# Patient Record
Sex: FEMALE | Race: WHITE | NOT HISPANIC OR LATINO | Employment: OTHER | URBAN - METROPOLITAN AREA
[De-identification: names, ages, dates, MRNs, and addresses within clinical notes are randomized per-mention and may not be internally consistent; named-entity substitution may affect disease eponyms.]

---

## 2017-01-20 ENCOUNTER — CONVERSION ENCOUNTER (OUTPATIENT)
Dept: GENERAL RADIOLOGY | Facility: HOSPITAL | Age: 57
End: 2017-01-20

## 2018-01-24 ENCOUNTER — CONVERSION ENCOUNTER (OUTPATIENT)
Dept: GENERAL RADIOLOGY | Facility: HOSPITAL | Age: 58
End: 2018-01-24

## 2019-02-09 ENCOUNTER — HOSPITAL ENCOUNTER (OUTPATIENT)
Dept: MAMMOGRAPHY | Facility: HOSPITAL | Age: 59
Discharge: HOME | End: 2019-02-09
Attending: OBSTETRICS & GYNECOLOGY

## 2020-03-02 ENCOUNTER — OFFICE VISIT CONVERTED (OUTPATIENT)
Dept: PODIATRY | Facility: CLINIC | Age: 60
End: 2020-03-02
Attending: PODIATRIST

## 2020-06-04 ENCOUNTER — HOSPITAL ENCOUNTER (OUTPATIENT)
Dept: GENERAL RADIOLOGY | Facility: HOSPITAL | Age: 60
Discharge: HOME | End: 2020-06-04
Attending: OBSTETRICS & GYNECOLOGY

## 2020-10-28 ENCOUNTER — HOSPITAL ENCOUNTER (OUTPATIENT)
Dept: LAB | Facility: HOSPITAL | Age: 60
Discharge: HOME | End: 2020-10-28
Attending: FAMILY MEDICINE

## 2020-10-28 LAB
ALBUMIN SERPL-MCNC: 4.3 G/DL (ref 3.5–5)
ALBUMIN/GLOB SERPL: 1.8 {RATIO} (ref 1.4–2.6)
ALP SERPL-CCNC: 91 U/L (ref 53–141)
ALT SERPL-CCNC: 22 U/L (ref 10–40)
ANION GAP SERPL CALC-SCNC: 14 MMOL/L (ref 8–19)
AST SERPL-CCNC: 24 U/L (ref 15–50)
BASOPHILS # BLD AUTO: 0.03 10*3/UL (ref 0–0.2)
BASOPHILS NFR BLD AUTO: 0.8 % (ref 0–3)
BILIRUB SERPL-MCNC: 0.42 MG/DL (ref 0.2–1.3)
BUN SERPL-MCNC: 21 MG/DL (ref 5–25)
BUN/CREAT SERPL: 38 {RATIO} (ref 6–20)
CALCIUM SERPL-MCNC: 9.7 MG/DL (ref 8.7–10.4)
CHLORIDE SERPL-SCNC: 106 MMOL/L (ref 99–111)
CHOLEST SERPL-MCNC: 181 MG/DL (ref 107–200)
CHOLEST/HDLC SERPL: 3.2 {RATIO} (ref 3–6)
CONV ABS IMM GRAN: 0.01 10*3/UL (ref 0–0.2)
CONV CO2: 25 MMOL/L (ref 22–32)
CONV IMMATURE GRAN: 0.3 % (ref 0–1.8)
CONV TOTAL PROTEIN: 6.7 G/DL (ref 6.3–8.2)
CREAT UR-MCNC: 0.55 MG/DL (ref 0.5–0.9)
DEPRECATED RDW RBC AUTO: 42.4 FL (ref 36.4–46.3)
EOSINOPHIL # BLD AUTO: 0.07 10*3/UL (ref 0–0.7)
EOSINOPHIL # BLD AUTO: 1.8 % (ref 0–7)
ERYTHROCYTE [DISTWIDTH] IN BLOOD BY AUTOMATED COUNT: 11.8 % (ref 11.7–14.4)
GFR SERPLBLD BASED ON 1.73 SQ M-ARVRAT: >60 ML/MIN/{1.73_M2}
GLOBULIN UR ELPH-MCNC: 2.4 G/DL (ref 2–3.5)
GLUCOSE SERPL-MCNC: 101 MG/DL (ref 65–99)
HCT VFR BLD AUTO: 44.4 % (ref 37–47)
HDLC SERPL-MCNC: 56 MG/DL (ref 40–60)
HGB BLD-MCNC: 15.5 G/DL (ref 12–16)
LDLC SERPL CALC-MCNC: 111 MG/DL (ref 70–100)
LYMPHOCYTES # BLD AUTO: 1.29 10*3/UL (ref 1–5)
LYMPHOCYTES NFR BLD AUTO: 33.7 % (ref 20–45)
MCH RBC QN AUTO: 34.1 PG (ref 27–31)
MCHC RBC AUTO-ENTMCNC: 34.9 G/DL (ref 33–37)
MCV RBC AUTO: 97.6 FL (ref 81–99)
MONOCYTES # BLD AUTO: 0.51 10*3/UL (ref 0.2–1.2)
MONOCYTES NFR BLD AUTO: 13.3 % (ref 3–10)
NEUTROPHILS # BLD AUTO: 1.92 10*3/UL (ref 2–8)
NEUTROPHILS NFR BLD AUTO: 50.1 % (ref 30–85)
NRBC CBCN: 0 % (ref 0–0.7)
OSMOLALITY SERPL CALC.SUM OF ELEC: 295 MOSM/KG (ref 273–304)
PLATELET # BLD AUTO: 160 10*3/UL (ref 130–400)
PMV BLD AUTO: 9.9 FL (ref 9.4–12.3)
POTASSIUM SERPL-SCNC: 4.2 MMOL/L (ref 3.5–5.3)
RBC # BLD AUTO: 4.55 10*6/UL (ref 4.2–5.4)
SODIUM SERPL-SCNC: 141 MMOL/L (ref 135–147)
T4 FREE SERPL-MCNC: 1.1 NG/DL (ref 0.9–1.8)
TRIGL SERPL-MCNC: 71 MG/DL (ref 40–150)
TSH SERPL-ACNC: 0.02 M[IU]/L (ref 0.27–4.2)
VLDLC SERPL-MCNC: 14 MG/DL (ref 5–37)
WBC # BLD AUTO: 3.83 10*3/UL (ref 4.8–10.8)

## 2021-01-12 ENCOUNTER — OFFICE VISIT CONVERTED (OUTPATIENT)
Dept: FAMILY MEDICINE CLINIC | Facility: CLINIC | Age: 61
End: 2021-01-12
Attending: STUDENT IN AN ORGANIZED HEALTH CARE EDUCATION/TRAINING PROGRAM

## 2021-03-22 ENCOUNTER — HOSPITAL ENCOUNTER (OUTPATIENT)
Dept: GENERAL RADIOLOGY | Facility: HOSPITAL | Age: 61
Discharge: HOME | End: 2021-03-22
Attending: STUDENT IN AN ORGANIZED HEALTH CARE EDUCATION/TRAINING PROGRAM

## 2021-04-19 ENCOUNTER — CONVERSION ENCOUNTER (OUTPATIENT)
Dept: SURGERY | Facility: CLINIC | Age: 61
End: 2021-04-19

## 2021-04-19 ENCOUNTER — OFFICE VISIT CONVERTED (OUTPATIENT)
Dept: UROLOGY | Facility: CLINIC | Age: 61
End: 2021-04-19
Attending: UROLOGY

## 2021-04-19 LAB
BILIRUB UR QL STRIP: NEGATIVE
COLOR UR: YELLOW
CONV CLARITY OF URINE: CLEAR
CONV PROTEIN IN URINE BY AUTOMATED TEST STRIP: NEGATIVE
CONV UROBILINOGEN IN URINE BY AUTOMATED TEST STRIP: NORMAL
GLUCOSE UR QL: NEGATIVE
HGB UR QL STRIP: NEGATIVE
KETONES UR QL STRIP: NEGATIVE
LEUKOCYTE ESTERASE UR QL STRIP: NEGATIVE
NITRITE UR QL STRIP: NEGATIVE
PH UR STRIP.AUTO: 6.5 [PH]
SP GR UR: 1.02

## 2021-05-10 NOTE — H&P
History and Physical      Patient Name: Damaris Andrews   Patient ID: [id number]   Sex: Female   YOB: 1960    Primary Care Provider: Raffy Roland MD   Referring Provider: Jovanny Anderson DPSANYA    Visit Date: 2021    Provider: Charlene Alexandre MD   Location: [facility]   Location Address: [address] [place] [address], [address] [place] [address]  [id number]   Location Phone: [phone]          Chief Complaint     New pt here today to [facility] care.       History Of Present Illness  Damaris Andrews is a 60 year old /White female who presents for evaluation and treatment of:      60 years old female with past medical history of breast cancer/in remission/s/p chemo, hypertension, hypothyroidism and tachycardia comes to the clinic today to establish care and follow-up on chronic conditions.    Patient is following up with OB/GYN.    Patient's blood work 3 months ago reviewed; significant for low TSH but normal T4; patient is taking [place] Thyroid.    Patient is taking lisinopril for high blood pressure; controlled.    Patient denies any chest pain or shortness of breath with activities.           Past Medical History  Disease Name Date Onset Notes   Arthritis --  --    Athletes foot --  --    Breast cancer --  --    Ingrown toenail --  --    Plantar wart --  --          Past Surgical History  Procedure Name Date Notes    --  --    Mastectomy, Right --  --    Tubal ligation --  --    Tummy tuck --  --          Medication List  Name Date Started Instructions   [place] Thyroid oral  take 1 by oral route daily   estradiol 10 mcg vaginal tablet  insert 1 tablet (10 mcg) by vaginal route every other day.   lisinopril oral  --    naproxen 500 mg oral tablet  take 1 tablet (500 mg) by oral route 2 times per day with food   Premarin 0.625 mg oral tablet  take 1 tablet (0.625 mg) by oral route every other day.         Family Medical History  Disease Name Relative/Age Notes  "  Family history of stroke Father/   --    Family history of heart disease Father/   --          Social History  Finding Status Start/Stop Quantity Notes   Alcohol Current some day --/-- --  --    Tobacco --  --/-- --  --          Review of Systems  · Constitutional  o Denies  o : fatigue, fever  · Eyes  o Denies  o : discharge from eye, redness  · HENT  o Denies  o : headaches, congestion  · Cardiovascular  o Denies  o : chest pain, palpitations  · Respiratory  o Denies  o : shortness of breath, wheezing, cough  · Gastrointestinal  o Denies  o : vomiting, diarrhea, constipation  · Genitourinary  o Denies  o : dysuria, hematuria  · Integument  o Denies  o : rash, new skin lesions  · Neurologic  o Denies  o : altered mental status, seizures  · Musculoskeletal  o Denies  o : weakness, joint swelling  · Psychiatric  o Denies  o : anxiety, depression  · Heme-Lymph  o Denies  o : lymph node enlargement, tenderness      Vitals  Date Time BP Position Site L\R Cuff Size HR RR TEMP (F) WT  HT  BMI kg/m2 BSA m2 O2 Sat FR L/min FiO2 HC       01/12/2021 10:20 /97 Sitting    90 - R 16 98 149lbs 0oz 5'  1\" 28.15 1.71 97 %  21%          Physical Examination  · Constitutional  o Appearance  o : alert, in no acute distress, well developed, well-nourished  · Head and Face  o Head  o : normocephalic, atraumatic, non tender, no palpable masses or nodules.  o Face  o : no facial lesions  · Eyes  o Vision  o : Acuity: grossly normal at distance, Conjuntivae: Normal, Sclerae white, Pupils: PERRL, Cornea: Clear, no lesions bilateral  · Neck  o Inspection/Palpation  o : Supple, no masses or tenderness, no deformities, Trachea: Midline, ROM: with in normal limits, no neck stiffness  o Thyroid  o : no thyomegaly, no palpabale masses   · Respiratory  o Auscultation of Lungs  o : normal breath sounds throughout  · Cardiovascular  o Heart  o : Regular rate and rhythm, Normal S1,S2 , No cardiac murmers, No S3 or S4 gallop or " rubs  · Gastrointestinal  o Abdominal Examination  o : abdomen soft, nontender, non distended, no rigidity, gaurding, rebound tenderness, no ventral or inguinal hernias present  o Liver and spleen  o : no hepatomegaly present, liver nontender to palpation, spleen not palpable  · Musculoskeletal  o General  o :   § General Musculoskeletal  § : No joint swelling or deformity., Muscle tone, strength, and development grossly normal.  · Skin and Subcutaneous Tissue  o General Inspection  o : no rashes , or lesions present, normal skin color, warm and dry  o Digits and Nails  o : no clubbing, cyanosis, deformities or edema present, normal appearing nails  · Neurologic  o Mental Status Examination  o : alert and oriented to time, place, and person., Cranial Nerves: grossly intact,  · Psychiatric  o Mood and Affect  o : normal mood and affect          Assessment  · Screening for depression     V79.0/Z13.89  PHQ-9 score is 6; multifactorial  Patient does not think she is depressed  · Post menopausal syndrome     V49.81/Z78.0  · HTN (hypertension)     401.9/I10  · Thyroid disease     246.9/E07.9  · Elevated TSH     794.5/R79.89  · History of breast cancer     V10.3/Z85.3      Plan  · Orders  o Annual depression screening, 15 minutes (51490, ) - V79.0/Z13.89 - 01/12/2021  o ACO-18: Positive screen for clinical depression using a standardized tool and a follow-up plan documented () - V79.0/Z13.89 - 01/12/2021  o DEXA Bone Density, 1 or more sites, axial skeleton [facility] (07445) - V49.81/Z78.0 - 01/12/2021  o Thyroid Profile (35647, 03514, THYII) - 794.5/R79.89 - 01/12/2021  o ACO-18: Positive screen for clinical depression using a standardized tool and a follow-up plan documented () - - 01/12/2021  o ACO-14: Influenza immunization was not administered for reasons documented [facility] () - - 01/12/2021  o ACO-39: Current medications updated and reviewed (0839F, ) - - 01/12/2021  · Instructions  o Depression Screen  completed and scanned into the EMR under the designated folder within the patient's documents.  o Today's PHQ-9 result is 6___  o The provider screening met the required time of 15 minutes.  o Stop taking calcium supplements for at least 48 hours prior to your exam. Failure to stop supplements could alter results, and the radiologists will require you to reschedule your test.  o Patient was educated/instructed on their diagnosis, treatment and medications prior to discharge from the clinic today.  o Patient counseled to reduce calorie intake.  o Patient was instructed to exercise regularly.  o Patient instructed to seek medical attention urgently for new or worsening symptoms.  o Call the office with any concerns or questions.  o Bring all medicines with their bottles to each office visit.  o Minutes spent with patient including greater than 50% in Education/Counseling/Care Coordination.  o Time spent with the patient was minutes, more than 50% face to face.  o Discussed Covid-19 precautions including, but not limited to, social distancing, avoid touching your face, and hand washing.   · Disposition  o Call or Return if symptoms worsen or persist.  o Follow Up in 3 months.            Electronically Signed by: Charlene Alexandre MD -Author on January 12, 2021 12:33:41 PM

## 2021-05-11 NOTE — H&P
History and Physical      Patient Name: Damaris Andrews   Patient ID: [id number]   Sex: Female   YOB: 1960    Primary Care Provider: Charlene Alexandre MD   Referring Provider: Tomas Morales MD    Visit Date: 2021    Provider: Tomas Ventura MD   Location: [facility]   Location Address: [address] [place] [address]  [id number]   Location Phone: [phone]          Chief Complaint  · urological issues      History Of Present Illness     60-year-old female here today for incontinence    FOREIGN -leaks with coughing,sneezing, sit up,  only leaks a small amount.    UUI -  none    Does not have to wear pads.  Changes underwear.  Bothers her mostly when she is working out.    Kegels -  did not help.    pt is sexually active.    Patient has had no gross hematuria, dysuria or recurrent urinary tract infections.  No history of kidney or bladder stone.    No urologic family history,   Has never had any urologic surgery. still has uterus.    No cardiopulmonary history.  Patient does not smoke.  Patient does not use blood thinner. h/o Breast cancer.         Past Medical History  Arthritis; Athletes foot; Bladder disorder; Breast cancer; Breast lump; Cancer; High blood pressure; Hypothyroidism; Ingrown toenail; Limb Pain; Limb Swelling; Plantar wart         Past Surgical History  Breast; ; Mastectomy, Right; Port Placement; Tubal ligation; Tummy tuck         Medication List  [place] Thyroid oral; estradiol 10 mcg vaginal tablet; lisinopril oral; naproxen 500 mg oral tablet; Premarin 0.625 mg oral tablet         Allergy List  NO KNOWN DRUG ALLERGIES         Family Medical History  Family history of stroke; Family history of heart disease         Social History  Alcohol (Current some day); Tobacco         Immunizations  Name Date Admin   Influenza Refused         Review of Systems  · Constitutional  o Denies  o : fatigue, fever, chills  · HENT  o Denies  o :  "headaches  · Cardiovascular  o Denies  o : chest pain  · Respiratory  o Denies  o : shortness of breath  · Gastrointestinal  o Denies  o : nausea, vomiting, diarrhea      Vitals  Date Time BP Position Site L\R Cuff Size HR RR TEMP (F) WT  HT  BMI kg/m2 BSA m2 O2 Sat FR L/min FiO2        04/19/2021 02:07 PM       15  145lbs 0oz 5'  1\" 27.4 1.68             Physical Examination  · Constitutional  o Appearance  o : Well-appearing, well-developed, in no acute distress  · Respiratory  o Respiratory Effort  o : Unlabored breathing  · Cardiovascular  o Heart  o :   § Auscultation of Heart  § : Regular rate and rhythm, no murmurs  · Gastrointestinal  o Abdominal Examination  o : Nontender, nondistended, no rigidity or guarding, no hepatosplenomegaly  · Neurologic  o Mental Status Examination  o :   § Orientation  § : Grossly oriented to person, place and time, judgment and insight intact, normal mood and affect          Results  · In-Office Procedures  o Surgical procedure  § IOP - Bladder Scan/Residual Urine (60002)   § Specimen vol Ur: 26   o Lab procedure  § IOP - Urinalysis without Microscopy ([place]) [facility] (43517)   § Color Ur: Yellow   § Clarity Ur: Clear   § Glucose Ur Ql Strip: Negative   § Bilirub Ur Ql Strip: Negative   § Ketones Ur Ql Strip: Negative   § Sp Gr Ur Qn: 1.025   § Hgb Ur Ql Strip: Negative   § pH Ur-LsCnc: 6.5   § Prot Ur Ql Strip: Negative   § Urobilinogen Ur Strip-mCnc: 0.2 E.U./dL   § Nitrite Ur Ql Strip: Negative   § WBC Est Ur Ql Strip: Negative       Assessment  · Stress incontinence of urine     NOCODE/N39.3      Plan  · Medications  o Medications have been Reconciled  o Transition of Care or Provider Policy  · Instructions  o Electronically Identified Patient Education Materials Provided Electronically       We discussed conservative management with Kegel exercises (which she has tried and failed to control symptoms), injectable bulking agents, and midurethral sling.  We discussed that " bulking agents can be associated with temporary retention and sometimes need to be repeated over the years.  We discussed that I used bulking agents in the absence of urethral hypermobility and the reasons behind this.  We discussed different types of slings including autologous and synthetic.  We discussed that synthetics have been shown to be safe and effective in the long term.  We discussed that slings or bulking agents are not intended to cure urgency, frequency or urge incontinence.  We discussed that slings can occasionally be associated with de anton urgency.  We discussed unique risks of synthetic slings including but not limited to bleeding, infection, damage to bladder or urethra, retention with need for takedown, failure with need for additional procedures, and erosion of the sling into the bladder, urethra or vagina in the long term.  She would like to proceed with a transobturator sling.    We discussed the new FDA warnings regarding the use of mesh for the treatment of stress incontinence and pelvic floor prolapse.  We discussed that mesh related complications when treating stress incontinence are much less than the rate for prolapse, but still occur.  We discussed the benefits of mesh include a more durable graft that could lead to better longevity.  We discussed risks of mesh including dyspareunia and pelvic pain that may or may not improve with revision or removal of mesh.     Discussed with the patient because she has minimal leakage I would recommend Kegel exercises and these were discussed with the patient today in detail.  She thinks this is reasonable and will follow up with me as needed.  No surgery warranted at this time.             Electronically Signed by: Tomas Ventura MD -Author on April 19, 2021 04:25:01 PM

## 2021-05-14 VITALS — WEIGHT: 145 LBS | BODY MASS INDEX: 27.38 KG/M2 | RESPIRATION RATE: 15 BRPM | HEIGHT: 61 IN

## 2021-05-14 VITALS
HEIGHT: 61 IN | WEIGHT: 149 LBS | HEART RATE: 90 BPM | RESPIRATION RATE: 16 BRPM | SYSTOLIC BLOOD PRESSURE: 136 MMHG | TEMPERATURE: 98 F | DIASTOLIC BLOOD PRESSURE: 97 MMHG | OXYGEN SATURATION: 97 % | BODY MASS INDEX: 28.13 KG/M2

## 2021-05-15 VITALS
WEIGHT: 149 LBS | DIASTOLIC BLOOD PRESSURE: 82 MMHG | SYSTOLIC BLOOD PRESSURE: 138 MMHG | BODY MASS INDEX: 28.13 KG/M2 | OXYGEN SATURATION: 100 % | HEIGHT: 61 IN | HEART RATE: 77 BPM

## 2021-05-23 ENCOUNTER — TRANSCRIBE ORDERS (OUTPATIENT)
Dept: ONCOLOGY | Facility: HOSPITAL | Age: 61
End: 2021-05-23

## 2021-05-23 DIAGNOSIS — Z12.31 SCREENING MAMMOGRAM, ENCOUNTER FOR: Primary | ICD-10-CM

## 2021-06-24 ENCOUNTER — HOSPITAL ENCOUNTER (OUTPATIENT)
Dept: MAMMOGRAPHY | Facility: HOSPITAL | Age: 61
Discharge: HOME | End: 2021-06-24
Admitting: OBSTETRICS & GYNECOLOGY

## 2021-06-24 DIAGNOSIS — Z12.31 SCREENING MAMMOGRAM, ENCOUNTER FOR: ICD-10-CM

## 2021-06-24 PROCEDURE — 77063 BREAST TOMOSYNTHESIS BI: CPT

## 2021-06-24 PROCEDURE — 77067 SCR MAMMO BI INCL CAD: CPT

## 2021-08-31 ENCOUNTER — OFFICE VISIT (OUTPATIENT)
Dept: ORTHOPEDIC SURGERY | Facility: CLINIC | Age: 61
End: 2021-08-31

## 2021-08-31 VITALS — BODY MASS INDEX: 27.38 KG/M2 | WEIGHT: 145 LBS | RESPIRATION RATE: 14 BRPM | HEIGHT: 61 IN

## 2021-08-31 DIAGNOSIS — M79.651 RIGHT THIGH PAIN: Primary | ICD-10-CM

## 2021-08-31 PROCEDURE — 99203 OFFICE O/P NEW LOW 30 MIN: CPT | Performed by: ORTHOPAEDIC SURGERY

## 2021-08-31 RX ORDER — LISINOPRIL 10 MG/1
TABLET ORAL
COMMUNITY
Start: 2021-07-20

## 2021-08-31 RX ORDER — NAPROXEN 500 MG/1
TABLET ORAL
COMMUNITY

## 2021-08-31 RX ORDER — HYDROCHLOROTHIAZIDE 12.5 MG/1
12.5 TABLET ORAL DAILY
COMMUNITY
Start: 2021-07-30

## 2021-08-31 RX ORDER — THYROID, PORCINE 120 MG/1
120 TABLET ORAL DAILY
COMMUNITY
Start: 2021-07-10

## 2021-08-31 NOTE — PROGRESS NOTES
"Chief Complaint  Pain of the Right Hip     Subjective      Damaris Andrews presents to [facility] for evaluation of the right hip. She states she has right thigh pain for about 6 months. She has no known injury or trauma. She reports pain with certain ROM. She denies pain with weight bearing. She describes it as a dull ache. She denies hip pain. She has had an x-ray at [facility] that we reviewed today. She has a history of breast cancer that was treated operatively.     No Known Allergies     Social History     Socioeconomic History   • Marital status:      Spouse name: Not on file   • Number of children: Not on file   • Years of education: Not on file   • Highest education level: Not on file   Tobacco Use   • Smoking status: Never Smoker   • Smokeless tobacco: Never Used        Review of Systems     Objective   Vital Signs:   Resp 14   Ht 154.9 cm (61\")   Wt 65.8 kg (145 lb)   BMI 27.40 kg/m²       Physical Exam  Constitutional:       Appearance: Normal appearance. The patient is well-developed and normal weight.   HENT:      Head: Normocephalic.      Right Ear: Hearing and external ear normal.      Left Ear: Hearing and external ear normal.      Nose: Nose normal.   Eyes:      Conjunctiva/sclera: Conjunctivae normal.   Cardiovascular:      Rate and Rhythm: Normal rate.   Pulmonary:      Effort: Pulmonary effort is normal.      Breath sounds: No wheezing or rales.   Abdominal:      Palpations: Abdomen is soft.      Tenderness: There is no abdominal tenderness.   Musculoskeletal:      Cervical back: Normal range of motion.   Skin:     Findings: No rash.   Neurological:      Mental Status: The patient is alert and oriented to person, place, and time.   Psychiatric:         Mood and Affect: Mood and affect normal.         Judgment: Judgment normal.       Ortho Exam      Right hip- no point tenderness. Full hip ROM. Neurovascularly intact. Positive EHL, FHL, GS and TA. Sensation " intact to all 5 nerves of the foot. Positive pulses.     Procedures    [place] X-ray- Negative right hip series.      Imaging Results (Most Recent)     None           Result Review :       No results found.           Assessment and Plan     DX: Right thigh pain    Discussed the treatment plan with the patient.  Plan for MRI with and without contrast of thigh.     Call or return if worsening symptoms.    Follow Up     After MRI      Patient was given instructions and counseling regarding her condition or for health maintenance advice. Please see specific information pulled into the AVS if appropriate.     Scribed for Raffy Neal MD by Bernie Miramontes.  08/31/21   13:42 EDT    I have personally performed the services described in this document as scribed by the above individual and it is both accurate and complete. Raffy Neal MD 08/31/21

## 2021-09-24 ENCOUNTER — OFFICE VISIT (OUTPATIENT)
Dept: ORTHOPEDIC SURGERY | Facility: CLINIC | Age: 61
End: 2021-09-24

## 2021-09-24 VITALS — HEIGHT: 61 IN | OXYGEN SATURATION: 99 % | HEART RATE: 87 BPM | WEIGHT: 148.8 LBS | BODY MASS INDEX: 28.09 KG/M2

## 2021-09-24 DIAGNOSIS — R22.41 MASS OF RIGHT THIGH: Primary | ICD-10-CM

## 2021-09-24 DIAGNOSIS — D17.24 LIPOMA OF LEFT THIGH: ICD-10-CM

## 2021-09-24 PROCEDURE — 99213 OFFICE O/P EST LOW 20 MIN: CPT | Performed by: ORTHOPAEDIC SURGERY

## 2021-09-24 NOTE — PROGRESS NOTES
"Chief Complaint  Pain of the Right Femur     Subjective      Damaris Andrews presents to [facility] for follow up evaluation of the right femur. She states she has right thigh pain for about 6 months. She has no known injury or trauma. She reports pain with certain ROM. She denies pain with weight bearing. She describes it as a dull ache. She recently had an MRI of the right femur and is here today to discuss the results.     No Known Allergies     Social History     Socioeconomic History   • Marital status:      Spouse name: Not on file   • Number of children: Not on file   • Years of education: Not on file   • Highest education level: Not on file   Tobacco Use   • Smoking status: Never Smoker   • Smokeless tobacco: Never Used   Vaping Use   • Vaping Use: Never used        Review of Systems     Objective   Vital Signs:   Pulse 87   Ht 154.9 cm (61\")   Wt 67.5 kg (148 lb 12.8 oz)   SpO2 99%   BMI 28.12 kg/m²       Physical Exam  Constitutional:       Appearance: Normal appearance. The patient is well-developed and normal weight.   HENT:      Head: Normocephalic.      Right Ear: Hearing and external ear normal.      Left Ear: Hearing and external ear normal.      Nose: Nose normal.   Eyes:      Conjunctiva/sclera: Conjunctivae normal.   Cardiovascular:      Rate and Rhythm: Normal rate.   Pulmonary:      Effort: Pulmonary effort is normal.      Breath sounds: No wheezing or rales.   Abdominal:      Palpations: Abdomen is soft.      Tenderness: There is no abdominal tenderness.   Musculoskeletal:      Cervical back: Normal range of motion.   Skin:     Findings: No rash.   Neurological:      Mental Status: The patient is alert and oriented to person, place, and time.   Psychiatric:         Mood and Affect: Mood and affect normal.         Judgment: Judgment normal.       Ortho Exam      Right hip- no point tenderness. Full hip ROM. Neurovascularly intact. Positive EHL, FHL, GS and " TA. Sensation intact to all 5 nerves of the foot. Positive pulses.     Procedures    MRI [place] 9/2021-  Predominantly fat signal mass surrounding the musculotendinous complex of the rectus  femoris, measuring 1.8 x 1.7 x 7 cm, corresponding to the marked area of concern along the  anterior aspect of the proximal/mid right thigh. No abnormal enhancement.  This most likely represents a benign intramuscular lipoma. Low-grade liposarcoma is  considered less likely in the differential.  2. No muscle or soft tissue edema. No acute osseous abnormalities.     Imaging Results (Most Recent)     None           Result Review :       No results found.           Assessment and Plan      DX: Left thigh Lipoma.     Discussed the treatment plan with the patient.  Plan for a referral to an Orthopedic Oncologist in [place] to be evaluated since she does a have a cancer history.     Call or return if worsening symptoms.    Follow Up     PRN      Patient was given instructions and counseling regarding her condition or for health maintenance advice. Please see specific information pulled into the AVS if appropriate.     Scribed for Raffy Neal MD by Bernie Miramontes.  09/24/21   10:04 EDT    I have personally performed the services described in this document as scribed by the above individual and it is both accurate and complete. Raffy Neal MD 09/28/21

## 2021-11-28 DIAGNOSIS — M79.651 RIGHT THIGH PAIN: ICD-10-CM

## 2022-06-21 ENCOUNTER — OFFICE VISIT (OUTPATIENT)
Dept: OBSTETRICS AND GYNECOLOGY | Facility: CLINIC | Age: 62
End: 2022-06-21

## 2022-06-21 VITALS
HEIGHT: 61 IN | DIASTOLIC BLOOD PRESSURE: 85 MMHG | HEART RATE: 102 BPM | SYSTOLIC BLOOD PRESSURE: 130 MMHG | WEIGHT: 130 LBS | BODY MASS INDEX: 24.55 KG/M2

## 2022-06-21 DIAGNOSIS — N95.2 VAGINAL ATROPHY: ICD-10-CM

## 2022-06-21 DIAGNOSIS — Z01.419 WELL WOMAN EXAM: Primary | ICD-10-CM

## 2022-06-21 PROBLEM — I10 HIGH BLOOD PRESSURE: Status: ACTIVE | Noted: 2022-06-21

## 2022-06-21 PROBLEM — C50.919 BREAST CANCER: Status: ACTIVE | Noted: 2022-06-21

## 2022-06-21 PROBLEM — M19.90 ARTHRITIS: Status: ACTIVE | Noted: 2022-06-21

## 2022-06-21 PROBLEM — E03.9 HYPOTHYROIDISM: Status: ACTIVE | Noted: 2022-06-21

## 2022-06-21 PROCEDURE — 99396 PREV VISIT EST AGE 40-64: CPT | Performed by: OBSTETRICS & GYNECOLOGY

## 2022-06-21 PROCEDURE — G0123 SCREEN CERV/VAG THIN LAYER: HCPCS | Performed by: OBSTETRICS & GYNECOLOGY

## 2022-06-21 RX ORDER — ESTRADIOL 10 UG/1
1 INSERT VAGINAL 2 TIMES WEEKLY
Qty: 24 TABLET | Refills: 3 | Status: SHIPPED | OUTPATIENT
Start: 2022-06-23 | End: 2022-09-21

## 2022-06-21 RX ORDER — ESTRADIOL 10 UG/1
INSERT VAGINAL
COMMUNITY
End: 2022-06-21 | Stop reason: SDUPTHER

## 2022-06-21 RX ORDER — CONJUGATED ESTROGENS 0.62 MG/G
CREAM VAGINAL EVERY OTHER DAY
Qty: 30 G | Refills: 5 | Status: SHIPPED | OUTPATIENT
Start: 2022-06-21

## 2022-06-21 RX ORDER — PHENTERMINE HYDROCHLORIDE 37.5 MG/1
TABLET ORAL
COMMUNITY
Start: 2022-05-31 | End: 2022-12-13

## 2022-06-21 NOTE — ASSESSMENT & PLAN NOTE
Recommend calcium 1200 mg daily and vitamin D 800 international units daily  Pap  Mammogram  Colonoscopy

## 2022-06-21 NOTE — PROGRESS NOTES
"Well Woman Visit    CC: CHINYERE    HPI:   61 y.o. who presents for a well woman exam.  The patient is postmenopausal.  The patient has a history of breast cancer in the past.  She is status post right mastectomy.  She has no complaints today.  She does need her vaginal estrogen refilled.  She is due for colonoscopy.    History: PMHx, Meds, Allergies, PSHx, Social Hx, and POBHx all reviewed and updated.      /85   Pulse 102   Ht 154.9 cm (61\")   Wt 59 kg (130 lb)   Breastfeeding No   BMI 24.56 kg/m²     Physical Exam  Vitals and nursing note reviewed. Exam conducted with a chaperone present.   Constitutional:       General: She is not in acute distress.     Appearance: Normal appearance. She is not ill-appearing.   Neck:      Thyroid: No thyroid mass or thyromegaly.   Chest:   Breasts:      Right: Absent. No axillary adenopathy or supraclavicular adenopathy.      Left: Normal. No swelling, bleeding, inverted nipple, mass, nipple discharge, skin change, tenderness, axillary adenopathy or supraclavicular adenopathy.       Abdominal:      General: Abdomen is flat. There is no distension.      Palpations: Abdomen is soft. There is no mass.      Tenderness: There is no abdominal tenderness. There is no guarding or rebound.      Hernia: No hernia is present. There is no hernia in the left inguinal area or right inguinal area.   Genitourinary:     General: Normal vulva.      Exam position: Lithotomy position.      Pubic Area: No rash.       Labia:         Right: No rash, tenderness, lesion or injury.         Left: No rash, tenderness, lesion or injury.       Urethra: No prolapse, urethral pain, urethral swelling or urethral lesion.      Vagina: Normal. No vaginal discharge, erythema, tenderness, bleeding, lesions or prolapsed vaginal walls.      Cervix: Normal.      Uterus: Normal.       Adnexa: Right adnexa normal and left adnexa normal.      Comments: There are mild atrophic changes to the vagina and " cervix  Lymphadenopathy:      Upper Body:      Right upper body: No supraclavicular, axillary or pectoral adenopathy.      Left upper body: No supraclavicular or axillary adenopathy.   Skin:     General: Skin is warm and dry.   Neurological:      Mental Status: She is alert and oriented to person, place, and time.   Psychiatric:         Mood and Affect: Mood normal.         Behavior: Behavior normal.         Thought Content: Thought content normal.         ASSESSMENT AND PLAN:    Diagnoses and all orders for this visit:    1. Well woman exam (Primary)  Assessment & Plan:  Recommend calcium 1200 mg daily and vitamin D 800 international units daily  Pap  Mammogram  Colonoscopy    Orders:  -     IGP,rfx Aptima HPV All Pth  -     Ambulatory Referral For Screening Colonoscopy  -     Mammo Screening Digital Tomosynthesis Bilateral With CAD; Future    2. Vaginal atrophy  Assessment & Plan:  Continue Vagifem and Premarin vaginal cream    Orders:  -     estradiol (VAGIFEM) 10 MCG tablet vaginal tablet; Insert 1 tablet into the vagina 2 (Two) Times a Week for 90 days.  Dispense: 24 tablet; Refill: 3  -     conjugated estrogens (Premarin) 0.625 MG/GM vaginal cream; Insert  into the vagina Every Other Day.  Dispense: 30 g; Refill: 5      Preventative:   MMG  Colonoscopy  s/p FLU vaccine this season  s/p COVID vaccine  Recommend COVID vaccine, R/B discussed    She understands the importance of having any ordered tests to be performed in a timely fashion.  The risks of not performing them include, but are not limited to, advanced cancer stages, bone loss from osteoporosis and/or subsequent increase in morbidity and/or mortality.  She is encouraged to review her results online and/or contact or office if she has questions.     Follow Up:  Return for Annual physical.    Tomas Morales MD  06/21/2022

## 2022-06-23 LAB
CONV .: NORMAL
CYTOLOGIST CVX/VAG CYTO: NORMAL
CYTOLOGY CVX/VAG DOC CYTO: NORMAL
CYTOLOGY CVX/VAG DOC THIN PREP: NORMAL
DX ICD CODE: NORMAL
HIV 1 & 2 AB SER-IMP: NORMAL
OTHER STN SPEC: NORMAL
STAT OF ADQ CVX/VAG CYTO-IMP: NORMAL

## 2022-06-27 ENCOUNTER — TELEPHONE (OUTPATIENT)
Dept: OBSTETRICS AND GYNECOLOGY | Facility: CLINIC | Age: 62
End: 2022-06-27

## 2022-06-27 NOTE — TELEPHONE ENCOUNTER
Pt pharm called for clarification of the rx for premarin vag cream. Pharm would like to know how many grams is pt to insert into vag every other day. Ref # [id number] [facility] order.

## 2022-06-29 ENCOUNTER — TELEPHONE (OUTPATIENT)
Dept: OBSTETRICS AND GYNECOLOGY | Facility: CLINIC | Age: 62
End: 2022-06-29

## 2022-07-07 ENCOUNTER — TELEPHONE (OUTPATIENT)
Dept: OBSTETRICS AND GYNECOLOGY | Facility: CLINIC | Age: 62
End: 2022-07-07

## 2022-10-31 ENCOUNTER — PREP FOR SURGERY (OUTPATIENT)
Dept: OTHER | Facility: HOSPITAL | Age: 62
End: 2022-10-31

## 2022-10-31 ENCOUNTER — CLINICAL SUPPORT (OUTPATIENT)
Dept: GASTROENTEROLOGY | Facility: CLINIC | Age: 62
End: 2022-10-31

## 2022-10-31 DIAGNOSIS — K59.00 CONSTIPATION, UNSPECIFIED CONSTIPATION TYPE: Primary | ICD-10-CM

## 2022-10-31 DIAGNOSIS — Z12.11 COLON CANCER SCREENING: Primary | ICD-10-CM

## 2022-10-31 NOTE — PROGRESS NOTES
Damaris Andrews  REASON FOR CALL: SCREENING CALL, LAST COLON UNKNOWN   SENT IN PREP: ALEXIS  DOS: 2023    Past Medical History:   Diagnosis Date   • Breast cancer (HCC)     RIGHT MASECTOMY   • Drug therapy    • Hx of radiation therapy      No Known Allergies  Past Surgical History:   Procedure Laterality Date   • ABDOMINOPLASTY     •  SECTION      x2   • COLONOSCOPY      DR. NOLASCO, DR. DELGADILLO   • MASTECTOMY Right      Social History     Socioeconomic History   • Marital status:    • Number of children: 2   Tobacco Use   • Smoking status: Never   • Smokeless tobacco: Never   Vaping Use   • Vaping Use: Never used   Substance and Sexual Activity   • Alcohol use: Yes     Comment: twice a day   • Drug use: Never   • Sexual activity: Yes     Partners: Male     Birth control/protection: Post-menopausal     Family History   Problem Relation Age of Onset   • Thyroid disease Mother    • Thyroid disease Maternal Grandmother    • Thyroid disease Maternal Aunt    • Thyroid cancer Cousin    • Breast cancer Neg Hx    • Ovarian cancer Neg Hx    • Uterine cancer Neg Hx    • Colon cancer Neg Hx    • Prostate cancer Neg Hx    • Melanoma Neg Hx        Current Outpatient Medications:   •  Prosperity Thyroid 120 MG tablet, , Disp: , Rfl:   •  conjugated estrogens (Premarin) 0.625 MG/GM vaginal cream, Insert  into the vagina Every Other Day., Disp: 30 g, Rfl: 5  •  hydroCHLOROthiazide (HYDRODIURIL) 12.5 MG tablet, , Disp: , Rfl:   •  lisinopril (PRINIVIL,ZESTRIL) 10 MG tablet, TAKE THREE TABLETS BY MOUTH ONCE DAILY, Disp: , Rfl:   •  naproxen (NAPROSYN) 500 MG tablet, naproxen 500 mg oral tablet take 1 tablet (500 mg) by oral route 2 times per day with food   Active, Disp: , Rfl:   •  phentermine (ADIPEX-P) 37.5 MG tablet, TAKE 1 TABLET BY MOUTH EVERY DAY FOR WEIGHT LOSS, Disp: , Rfl:

## 2022-11-03 ENCOUNTER — TELEPHONE (OUTPATIENT)
Dept: GASTROENTEROLOGY | Facility: CLINIC | Age: 62
End: 2022-11-03

## 2022-11-03 NOTE — TELEPHONE ENCOUNTER
Pt called in stating she needs her bowel prep Nulytley  sent over to [facility] on [address]. Updated pharmacy in chart. Procedure is scheduled 2/3/2023.

## 2022-11-09 ENCOUNTER — TELEPHONE (OUTPATIENT)
Dept: GASTROENTEROLOGY | Facility: CLINIC | Age: 62
End: 2022-11-09

## 2022-11-09 NOTE — TELEPHONE ENCOUNTER
Pt called in stating the instructions she has for her bowel prep do not match the type of prep that she received from the pharmacy. I confirmed that she has the 4 liter prep. I went over prep instructions and have mailed out the [facility] prep brochure to the patient.

## 2022-11-30 ENCOUNTER — TELEPHONE (OUTPATIENT)
Dept: GASTROENTEROLOGY | Facility: CLINIC | Age: 62
End: 2022-11-30

## 2022-11-30 NOTE — TELEPHONE ENCOUNTER
I left  reminding Ms Andrews of her scheduled scope on 12.14.2022, arrival time of 1:00pm Reminded of liquid diet the day prior. Reminded of bowel prep and instructions. michi

## 2022-12-13 RX ORDER — DIPHENOXYLATE HYDROCHLORIDE AND ATROPINE SULFATE 2.5; .025 MG/1; MG/1
1 TABLET ORAL DAILY
COMMUNITY

## 2022-12-13 RX ORDER — MELATONIN
1000 DAILY
COMMUNITY

## 2022-12-14 ENCOUNTER — ANESTHESIA (OUTPATIENT)
Dept: GASTROENTEROLOGY | Facility: HOSPITAL | Age: 62
End: 2022-12-14

## 2022-12-14 ENCOUNTER — ANESTHESIA EVENT (OUTPATIENT)
Dept: GASTROENTEROLOGY | Facility: HOSPITAL | Age: 62
End: 2022-12-14

## 2022-12-14 ENCOUNTER — HOSPITAL ENCOUNTER (OUTPATIENT)
Facility: HOSPITAL | Age: 62
Setting detail: HOSPITAL OUTPATIENT SURGERY
Discharge: HOME | End: 2022-12-14
Attending: INTERNAL MEDICINE | Admitting: INTERNAL MEDICINE

## 2022-12-14 VITALS
SYSTOLIC BLOOD PRESSURE: 143 MMHG | TEMPERATURE: 98 F | DIASTOLIC BLOOD PRESSURE: 67 MMHG | HEART RATE: 74 BPM | HEIGHT: 61 IN | OXYGEN SATURATION: 100 % | RESPIRATION RATE: 14 BRPM | BODY MASS INDEX: 23.81 KG/M2 | WEIGHT: 126.1 LBS

## 2022-12-14 PROCEDURE — 25010000002 PROPOFOL 10 MG/ML EMULSION: Performed by: NURSE ANESTHETIST, CERTIFIED REGISTERED

## 2022-12-14 PROCEDURE — 45378 DIAGNOSTIC COLONOSCOPY: CPT | Performed by: INTERNAL MEDICINE

## 2022-12-14 RX ORDER — PROPOFOL 10 MG/ML
VIAL (ML) INTRAVENOUS AS NEEDED
Status: DISCONTINUED | OUTPATIENT
Start: 2022-12-14 | End: 2022-12-14 | Stop reason: SURG

## 2022-12-14 RX ORDER — LIDOCAINE HYDROCHLORIDE 20 MG/ML
INJECTION, SOLUTION EPIDURAL; INFILTRATION; INTRACAUDAL; PERINEURAL AS NEEDED
Status: DISCONTINUED | OUTPATIENT
Start: 2022-12-14 | End: 2022-12-14 | Stop reason: SURG

## 2022-12-14 RX ORDER — SODIUM CHLORIDE, SODIUM LACTATE, POTASSIUM CHLORIDE, CALCIUM CHLORIDE 600; 310; 30; 20 MG/100ML; MG/100ML; MG/100ML; MG/100ML
30 INJECTION, SOLUTION INTRAVENOUS CONTINUOUS
Status: DISCONTINUED | OUTPATIENT
Start: 2022-12-14 | End: 2022-12-14 | Stop reason: HOSPADM

## 2022-12-14 RX ADMIN — LIDOCAINE HYDROCHLORIDE 50 MG: 20 INJECTION, SOLUTION EPIDURAL; INFILTRATION; INTRACAUDAL; PERINEURAL at 15:42

## 2022-12-14 RX ADMIN — PROPOFOL 50 MG: 10 INJECTION, EMULSION INTRAVENOUS at 15:47

## 2022-12-14 RX ADMIN — SODIUM CHLORIDE, POTASSIUM CHLORIDE, SODIUM LACTATE AND CALCIUM CHLORIDE: 600; 310; 30; 20 INJECTION, SOLUTION INTRAVENOUS at 15:38

## 2022-12-14 RX ADMIN — PROPOFOL 50 MG: 10 INJECTION, EMULSION INTRAVENOUS at 15:42

## 2022-12-14 RX ADMIN — PROPOFOL 150 MCG/KG/MIN: 10 INJECTION, EMULSION INTRAVENOUS at 15:42

## 2022-12-14 NOTE — ANESTHESIA PREPROCEDURE EVALUATION
Anesthesia Evaluation     Patient summary reviewed and Nursing notes reviewed   no history of anesthetic complications:  NPO Solid Status: > 8 hours  NPO Liquid Status: > 2 hours           Airway   Mallampati: II  TM distance: >3 FB  Neck ROM: full  No difficulty expected  Dental      Pulmonary - negative pulmonary ROS and normal exam    breath sounds clear to auscultation  Cardiovascular - normal exam  Exercise tolerance: good (4-7 METS)    Rhythm: regular  Rate: normal    (+) hypertension well controlled,       Neuro/Psych- negative ROS  GI/Hepatic/Renal/Endo    (+)   thyroid problem hypothyroidism    Musculoskeletal (-) negative ROS    Abdominal    Substance History - negative use     OB/GYN negative ob/gyn ROS         Other - negative ROS       ROS/Med Hx Other: PAT Nursing Notes unavailable.                   Anesthesia Plan    ASA 2     general     (Total IV Anesthesia    Patient understands anesthesia not responsible for dental damage.  )  intravenous induction     Anesthetic plan, risks, benefits, and alternatives have been provided, discussed and informed consent has been obtained with: patient.  Pre-procedure education provided  Plan discussed with CRNA.        CODE STATUS:

## 2022-12-14 NOTE — H&P
ScreeningPre Procedure History & Physical    Chief Complaint:   Screening     Subjective     HPI:   Screening     Past Medical History:   Past Medical History:   Diagnosis Date   • Breast cancer (HCC)     RIGHT MASECTOMY   • Disease of thyroid gland    • Drug therapy    • Frequent nosebleeds    • Hx of radiation therapy    • Hypertension        Past Surgical History:  Past Surgical History:   Procedure Laterality Date   • ABDOMINOPLASTY     •  SECTION      x2   • COLONOSCOPY      DR. NOLASCO, DR. DELGADILLO   • MASTECTOMY Right    • VENOUS ACCESS DEVICE (PORT) INSERTION AND REMOVAL         Family History:  Family History   Problem Relation Age of Onset   • Thyroid disease Mother    • Thyroid disease Maternal Aunt    • Thyroid disease Maternal Grandmother    • Thyroid cancer Cousin    • Breast cancer Neg Hx    • Ovarian cancer Neg Hx    • Uterine cancer Neg Hx    • Colon cancer Neg Hx    • Prostate cancer Neg Hx    • Melanoma Neg Hx    • Malig Hyperthermia Neg Hx        Social History:   reports that she has never smoked. She has never used smokeless tobacco. She reports current alcohol use. She reports that she does not use drugs.    Medications:   Medications Prior to Admission   Medication Sig Dispense Refill Last Dose   • Dunn Thyroid 120 MG tablet Take 120 mg by mouth Daily.   2022   • Ca Cit Malate-Cholecalciferol (CALCIUM CITRATE MALATE-VIT D PO) Take 2 capsules by mouth Daily.   2022   • cholecalciferol (VITAMIN D3) 25 MCG (1000 UT) tablet Take 1,000 Units by mouth Daily.   Past Week   • CINNAMON PO Take  by mouth Daily.   Past Week   • conjugated estrogens (Premarin) 0.625 MG/GM vaginal cream Insert  into the vagina Every Other Day. 30 g 5 Past Week   • hydroCHLOROthiazide (HYDRODIURIL) 12.5 MG tablet Take 12.5 mg by mouth Daily.   Past Week   • lisinopril (PRINIVIL,ZESTRIL) 10 MG tablet TAKE THREE TABLETS BY MOUTH ONCE DAILY   2022   • multivitamin (THERAGRAN) tablet tablet Take 1  "tablet by mouth Daily.   Past Week   • naproxen (NAPROSYN) 500 MG tablet naproxen 500 mg oral tablet take 1 tablet (500 mg) by oral route 2 times per day with food   Active   Past Week       Allergies:  Patient has no known allergies.        Objective     Blood pressure 120/80, pulse 91, temperature 98 °F (36.7 °C), temperature source Temporal, resp. rate 16, height 154.9 cm (61\"), weight 57.2 kg (126 lb 1.7 oz), SpO2 99 %, not currently breastfeeding.    Physical Exam   Constitutional: Pt is oriented to person, place, and time and well-developed, well-nourished, and in no distress.   Mouth/Throat: Oropharynx is clear and moist.   Neck: Normal range of motion.   Cardiovascular: Normal rate, regular rhythm and normal heart sounds.    Pulmonary/Chest: Effort normal and breath sounds normal.   Abdominal: Soft. Nontender  Skin: Skin is warm and dry.   Psychiatric: Mood, memory, affect and judgment normal.     Assessment & Plan     Diagnosis:  Screening colonoscopy       Anticipated Surgical Procedure:  colonoscopy    The risks, benefits, and alternatives of this procedure have been discussed with the patient or the responsible party- the patient understands and agrees to proceed.            "

## 2022-12-14 NOTE — ANESTHESIA POSTPROCEDURE EVALUATION
Patient: Damaris Andrews    Procedure Summary     Date: 12/14/22 Room / Location: [facility] ENDOSCOPY 3 / [facility] ENDOSCOPY    Anesthesia Start: 1538 Anesthesia Stop: 1559    Procedure: COLONOSCOPY FOR SCREENING Diagnosis:       Colon cancer screening      (Colon cancer screening [Z12.11])    Surgeons: Star Covington MD Provider: Jose Antonio Miller MD    Anesthesia Type: general ASA Status: 2          Anesthesia Type: general    Vitals  Vitals Value Taken Time   /77 12/14/22 1615   Temp 36.1 °C (96.9 °F) 12/14/22 1605   Pulse 76 12/14/22 1618   Resp 20 12/14/22 1615   SpO2 100 % 12/14/22 1618   Vitals shown include unvalidated device data.        Post Anesthesia Care and Evaluation    Patient location during evaluation: bedside  Patient participation: complete - patient participated  Level of consciousness: awake  Pain management: adequate    Airway patency: patent  Anesthetic complications: No anesthetic complications  PONV Status: none  Cardiovascular status: acceptable and stable  Respiratory status: acceptable  Hydration status: acceptable    Comments: An Anesthesiologist personally participated in the most demanding procedures (including induction and emergence if applicable) in the anesthesia plan, monitored the course of anesthesia administration at frequent intervals and remained physically present and available for immediate diagnosis and treatment of emergencies.

## 2022-12-21 ENCOUNTER — TELEPHONE (OUTPATIENT)
Dept: GASTROENTEROLOGY | Facility: CLINIC | Age: 62
End: 2022-12-21

## 2022-12-21 NOTE — TELEPHONE ENCOUNTER
I have reviewed the patients colonoscopy report. The report showed a normal colonoscopy with diverticulosis.  No polyps removed or specimens collected.  Repeat colonoscopy in 10 years. Please place in recall.

## 2023-02-02 ENCOUNTER — TRANSCRIBE ORDERS (OUTPATIENT)
Dept: ADMINISTRATIVE | Facility: HOSPITAL | Age: 63
End: 2023-02-02
Payer: COMMERCIAL

## 2023-02-02 DIAGNOSIS — Z78.0 POST-MENOPAUSAL: Primary | ICD-10-CM

## 2023-03-30 ENCOUNTER — HOSPITAL ENCOUNTER (OUTPATIENT)
Dept: BONE DENSITY | Facility: HOSPITAL | Age: 63
Discharge: HOME | End: 2023-03-30
Admitting: NURSE PRACTITIONER
Payer: COMMERCIAL

## 2023-03-30 DIAGNOSIS — Z78.0 POST-MENOPAUSAL: ICD-10-CM

## 2023-03-30 PROCEDURE — 77080 DXA BONE DENSITY AXIAL: CPT

## 2023-07-25 ENCOUNTER — TELEPHONE (OUTPATIENT)
Dept: OBSTETRICS AND GYNECOLOGY | Facility: CLINIC | Age: 63
End: 2023-07-25
Payer: COMMERCIAL

## 2023-07-25 DIAGNOSIS — N95.2 VAGINAL ATROPHY: Primary | ICD-10-CM

## 2023-07-25 RX ORDER — ESTRADIOL 10 UG/1
1 INSERT VAGINAL 2 TIMES WEEKLY
Qty: 24 TABLET | Refills: 3 | Status: SHIPPED | OUTPATIENT
Start: 2023-07-27

## 2023-07-25 NOTE — TELEPHONE ENCOUNTER
Patient called stating she normally uses both the vaginal estrogen cream and the vaginal tablets. She only received a script for the cream at her annual in June. If she is to continue to use both she needs a script for the tablets sent to [facility] on file in her account. Please advise.

## 2023-08-18 ENCOUNTER — HOSPITAL ENCOUNTER (OUTPATIENT)
Dept: MAMMOGRAPHY | Facility: HOSPITAL | Age: 63
Discharge: HOME | End: 2023-08-18
Admitting: OBSTETRICS & GYNECOLOGY
Payer: COMMERCIAL

## 2023-08-18 DIAGNOSIS — Z01.419 WOMEN'S ANNUAL ROUTINE GYNECOLOGICAL EXAMINATION: ICD-10-CM

## 2023-08-18 PROCEDURE — 77063 BREAST TOMOSYNTHESIS BI: CPT

## 2023-08-18 PROCEDURE — 77067 SCR MAMMO BI INCL CAD: CPT

## 2023-11-03 ENCOUNTER — LAB (OUTPATIENT)
Dept: LAB | Facility: HOSPITAL | Age: 63
End: 2023-11-03
Payer: COMMERCIAL

## 2023-11-03 ENCOUNTER — TRANSCRIBE ORDERS (OUTPATIENT)
Dept: ADMINISTRATIVE | Facility: HOSPITAL | Age: 63
End: 2023-11-03
Payer: COMMERCIAL

## 2023-11-03 DIAGNOSIS — R50.9 HYPERTHERMIA-INDUCED DEFECT: Primary | ICD-10-CM

## 2023-11-03 DIAGNOSIS — R50.9 HYPERTHERMIA-INDUCED DEFECT: ICD-10-CM

## 2023-11-03 LAB
BASOPHILS # BLD AUTO: 0.04 10*3/MM3 (ref 0–0.2)
BASOPHILS NFR BLD AUTO: 0.8 % (ref 0–1.5)
DEPRECATED RDW RBC AUTO: 43.1 FL (ref 37–54)
EOSINOPHIL # BLD AUTO: 0.09 10*3/MM3 (ref 0–0.4)
EOSINOPHIL NFR BLD AUTO: 1.8 % (ref 0.3–6.2)
ERYTHROCYTE [DISTWIDTH] IN BLOOD BY AUTOMATED COUNT: 11.9 % (ref 12.3–15.4)
HCT VFR BLD AUTO: 41.3 % (ref 34–46.6)
HGB BLD-MCNC: 14.1 G/DL (ref 12–15.9)
IMM GRANULOCYTES # BLD AUTO: 0.01 10*3/MM3 (ref 0–0.05)
IMM GRANULOCYTES NFR BLD AUTO: 0.2 % (ref 0–0.5)
LYMPHOCYTES # BLD AUTO: 1.57 10*3/MM3 (ref 0.7–3.1)
LYMPHOCYTES NFR BLD AUTO: 31.5 % (ref 19.6–45.3)
MCH RBC QN AUTO: 33.9 PG (ref 26.6–33)
MCHC RBC AUTO-ENTMCNC: 34.1 G/DL (ref 31.5–35.7)
MCV RBC AUTO: 99.3 FL (ref 79–97)
MONOCYTES # BLD AUTO: 0.74 10*3/MM3 (ref 0.1–0.9)
MONOCYTES NFR BLD AUTO: 14.8 % (ref 5–12)
NEUTROPHILS NFR BLD AUTO: 2.54 10*3/MM3 (ref 1.7–7)
NEUTROPHILS NFR BLD AUTO: 50.9 % (ref 42.7–76)
NRBC BLD AUTO-RTO: 0 /100 WBC (ref 0–0.2)
PLATELET # BLD AUTO: 189 10*3/MM3 (ref 140–450)
PMV BLD AUTO: 9.3 FL (ref 6–12)
RBC # BLD AUTO: 4.16 10*6/MM3 (ref 3.77–5.28)
WBC NRBC COR # BLD: 4.99 10*3/MM3 (ref 3.4–10.8)

## 2023-11-03 PROCEDURE — 36415 COLL VENOUS BLD VENIPUNCTURE: CPT

## 2023-11-03 PROCEDURE — 85025 COMPLETE CBC W/AUTO DIFF WBC: CPT

## 2024-04-17 ENCOUNTER — TELEPHONE (OUTPATIENT)
Dept: OBSTETRICS AND GYNECOLOGY | Facility: CLINIC | Age: 64
End: 2024-04-17
Payer: COMMERCIAL

## 2024-04-17 DIAGNOSIS — N95.2 VAGINAL ATROPHY: ICD-10-CM

## 2024-04-17 RX ORDER — CONJUGATED ESTROGENS 0.62 MG/G
CREAM VAGINAL EVERY OTHER DAY
Qty: 30 G | Refills: 2 | Status: SHIPPED | OUTPATIENT
Start: 2024-04-17

## 2024-04-17 NOTE — TELEPHONE ENCOUNTER
Oktyrell'd refill on Premarin Vaginal Cream thru 7/24.  Last seen 6/26/23.  Next appointment 7/8/24

## 2024-04-19 NOTE — TELEPHONE ENCOUNTER
[facility] sent a fax wanting clarification on how many grams of Premarin cream the pt is to insert vaginally every other day.  I went back and looked at Dr. Morales's notes and messages and the amount is 0.5 g.  I called and spoke with pharmacist Mikey and clarified the directions of the Rx with him.

## 2024-07-08 ENCOUNTER — OFFICE VISIT (OUTPATIENT)
Dept: OBSTETRICS AND GYNECOLOGY | Facility: CLINIC | Age: 64
End: 2024-07-08
Payer: COMMERCIAL

## 2024-07-08 VITALS
SYSTOLIC BLOOD PRESSURE: 132 MMHG | HEART RATE: 96 BPM | BODY MASS INDEX: 26.06 KG/M2 | HEIGHT: 61 IN | DIASTOLIC BLOOD PRESSURE: 69 MMHG | WEIGHT: 138 LBS

## 2024-07-08 DIAGNOSIS — N95.2 VAGINAL ATROPHY: ICD-10-CM

## 2024-07-08 DIAGNOSIS — Z01.419 WOMEN'S ANNUAL ROUTINE GYNECOLOGICAL EXAMINATION: Primary | ICD-10-CM

## 2024-07-08 DIAGNOSIS — F51.01 PRIMARY INSOMNIA: ICD-10-CM

## 2024-07-08 PROBLEM — I38 HEART VALVE DISORDER: Status: ACTIVE | Noted: 2024-01-21

## 2024-07-08 PROCEDURE — G0123 SCREEN CERV/VAG THIN LAYER: HCPCS | Performed by: OBSTETRICS & GYNECOLOGY

## 2024-07-08 PROCEDURE — 99396 PREV VISIT EST AGE 40-64: CPT | Performed by: OBSTETRICS & GYNECOLOGY

## 2024-07-08 RX ORDER — CONJUGATED ESTROGENS 0.62 MG/G
CREAM VAGINAL EVERY OTHER DAY
Qty: 30 G | Refills: 2 | Status: SHIPPED | OUTPATIENT
Start: 2024-07-08

## 2024-07-08 RX ORDER — ESTRADIOL 10 UG/1
1 INSERT VAGINAL 2 TIMES WEEKLY
Qty: 24 TABLET | Refills: 3 | Status: SHIPPED | OUTPATIENT
Start: 2024-07-08

## 2024-07-08 NOTE — PROGRESS NOTES
"Well Woman Visit    CC: Well woman visit    Subjective:   63 y.o. who presents for a well woman exam.  The patient has no complaints today.  She denies any postmenopausal bleeding.  She is happy with her hormone replacement therapy.  She wishes to continue her medications.  She does states she has not been sleeping well.  She has both a difficult time falling asleep as well as staying asleep.  She is interested in seeing someone who specializes in sleep disturbance to further discuss.    Last PAP:   Last Completed Pap Smear            Ordered - PAP SMEAR (Every 3 Years) Ordered on 2023  IGP,rfx Aptima HPV All Pth    2022  IGP,rfx Aptima HPV All Pth    2021  SCANNED - PAP SMEAR                  Last MMG:   Last Completed Mammogram            Ordered - MAMMOGRAM (Every 2 Years) Ordered on 2023  Mammo Screening Modified With Tomosynthesis Left With CAD    2021  Mammo Screening Modified With Tomosynthesis Left With CAD    2020  Mammo Screening Modified With Tomosynthesis Left With CAD    2019  Mammo Screening Modified With Tomosynthesis Left With CAD    2018  Mammo Screening Modified With Tomosynthesis Left With CAD    Only the first 5 history entries have been loaded, but more history exists.                     History: PMHx, Meds, Allergies, PSHx, Social Hx, and POBHx all reviewed and updated.    /69   Pulse 96   Ht 154.9 cm (61\")   Wt 62.6 kg (138 lb)   Breastfeeding No   BMI 26.07 kg/m²     Physical Exam  Vitals and nursing note reviewed. Exam conducted with a chaperone present.   Constitutional:       General: She is not in acute distress.     Appearance: Normal appearance. She is not ill-appearing.   HENT:      Head: Normocephalic and atraumatic.      Mouth/Throat:      Mouth: Mucous membranes are moist.      Pharynx: Oropharynx is clear.   Eyes:      Extraocular Movements: Extraocular movements intact.   Neck:      " Thyroid: No thyroid mass or thyromegaly.   Chest:   Breasts:     Breasts are symmetrical.      Right: Absent.      Left: Normal. No swelling, bleeding, inverted nipple, mass, nipple discharge, skin change or tenderness.   Abdominal:      General: There is no distension.      Palpations: Abdomen is soft. There is no mass.      Tenderness: There is no abdominal tenderness.      Hernia: There is no hernia in the left inguinal area or right inguinal area.   Genitourinary:     General: Normal vulva.      Exam position: Lithotomy position.      Pubic Area: No rash.       Labia:         Right: No rash, tenderness, lesion or injury.         Left: No rash, tenderness, lesion or injury.       Urethra: No prolapse, urethral pain or urethral lesion.      Vagina: Normal. No vaginal discharge, erythema, tenderness, bleeding or prolapsed vaginal walls.      Cervix: Normal. No friability, lesion, erythema or cervical bleeding.      Uterus: Normal. Not enlarged, not fixed, not tender and no uterine prolapse.       Adnexa:         Right: No mass or tenderness.          Left: No mass or tenderness.     Musculoskeletal:         General: No swelling.      Right lower leg: No edema.      Left lower leg: No edema.   Lymphadenopathy:      Upper Body:      Left upper body: No supraclavicular or axillary adenopathy.   Skin:     General: Skin is warm and dry.      Findings: No rash.   Neurological:      Mental Status: She is alert and oriented to person, place, and time.   Psychiatric:         Mood and Affect: Mood normal.         Behavior: Behavior normal.         Thought Content: Thought content normal.         Assessment and Plan:  Diagnoses and all orders for this visit:    1. Women's annual routine gynecological examination (Primary)  Assessment & Plan:  Pap  Mammogram    Orders:  -     IGP,rfx Aptima HPV All Pth  -     Mammo Screening Digital Tomosynthesis Bilateral With CAD; Future    2. Vaginal atrophy  Assessment & Plan:  Stable.   Continue Vagifem 1 tablet vaginally twice per week    Orders:  -     Estrogens Conjugated (Premarin) 0.625 MG/GM vaginal cream; Insert  into the vagina Every Other Day.  Dispense: 30 g; Refill: 2  -     estradiol (Vagifem) 10 MCG tablet vaginal tablet; Insert 1 tablet into the vagina 2 (Two) Times a Week.  Dispense: 24 tablet; Refill: 3    3. Primary insomnia  Assessment & Plan:  The patient is having difficult time both falling asleep and staying asleep.  She reports 2-3 times per week at least she can take numerous hours to fall asleep.  Sometimes she will fall asleep till 2 to 3 AM.  She states that she is at times only getting about 4 hours of sleep each night.  She would prefer not to take medications if possible.  She is interested in the etiology of why she is not falling asleep.  She is interested in a referral to a sleep medicine specialist.    Orders:  -     Ambulatory Referral to Sleep Medicine      Preventative:   MMG  Recommend FLU vaccine this season, R/B discussed  s/p COVID vaccine  COVID booster recommended    She understands the importance of having any ordered tests to be performed in a timely fashion.  The risks of not performing them include, but are not limited to, advanced cancer stages, bone loss from osteoporosis and/or subsequent increase in morbidity and/or mortality.  She is encouraged to review her results online and/or contact or office if she has questions.     Follow Up:  Return for Annual physical.    Tomas Morales MD  07/08/2024

## 2024-07-09 PROBLEM — F51.01 PRIMARY INSOMNIA: Status: ACTIVE | Noted: 2024-07-09

## 2024-07-09 NOTE — ASSESSMENT & PLAN NOTE
The patient is having difficult time both falling asleep and staying asleep.  She reports 2-3 times per week at least she can take numerous hours to fall asleep.  Sometimes she will fall asleep till 2 to 3 AM.  She states that she is at times only getting about 4 hours of sleep each night.  She would prefer not to take medications if possible.  She is interested in the etiology of why she is not falling asleep.  She is interested in a referral to a sleep medicine specialist.

## 2024-07-12 LAB
CONV .: NORMAL
CYTOLOGIST CVX/VAG CYTO: NORMAL
CYTOLOGY CVX/VAG DOC CYTO: NORMAL
CYTOLOGY CVX/VAG DOC THIN PREP: NORMAL
DX ICD CODE: NORMAL
Lab: NORMAL
OTHER STN SPEC: NORMAL
STAT OF ADQ CVX/VAG CYTO-IMP: NORMAL

## 2025-01-20 ENCOUNTER — TRANSCRIBE ORDERS (OUTPATIENT)
Facility: HOSPITAL | Age: 65
End: 2025-01-20
Payer: COMMERCIAL

## 2025-01-20 DIAGNOSIS — I89.0 LYMPHEDEMA: Primary | ICD-10-CM

## 2025-02-04 ENCOUNTER — HOSPITAL ENCOUNTER (OUTPATIENT)
Facility: HOSPITAL | Age: 65
Setting detail: THERAPIES SERIES
Discharge: HOME | End: 2025-02-04
Payer: COMMERCIAL

## 2025-02-04 DIAGNOSIS — C50.411 MALIGNANT NEOPLASM OF UPPER-OUTER QUADRANT OF RIGHT BREAST IN FEMALE, ESTROGEN RECEPTOR POSITIVE: ICD-10-CM

## 2025-02-04 DIAGNOSIS — Z17.0 MALIGNANT NEOPLASM OF UPPER-OUTER QUADRANT OF RIGHT BREAST IN FEMALE, ESTROGEN RECEPTOR POSITIVE: ICD-10-CM

## 2025-02-04 DIAGNOSIS — I89.0 LYMPHEDEMA: Primary | ICD-10-CM

## 2025-02-04 PROCEDURE — 93702 BIS XTRACELL FLUID ANALYSIS: CPT | Performed by: OCCUPATIONAL THERAPIST

## 2025-02-04 PROCEDURE — 97165 OT EVAL LOW COMPLEX 30 MIN: CPT | Performed by: OCCUPATIONAL THERAPIST

## 2025-02-04 NOTE — THERAPY EVALUATION
Outpatient Occupational Therapy Lymphedema Initial Evaluation   [facility]     Patient Name: Damaris Andrews  : 1960  MRN: [id number]  Today's Date: 2025      Visit Date: 2025    Patient Active Problem List   Diagnosis    Right thigh pain    Lipoma of left thigh    Arthritis    Breast cancer    High blood pressure    Hypothyroidism    Vaginal atrophy    Heart valve disorder    Women's annual routine gynecological examination    Primary insomnia        Past Medical History:   Diagnosis Date    Breast cancer     RIGHT MASECTOMY    Disease of thyroid gland     Drug therapy     Frequent nosebleeds     Hx of radiation therapy     Hypertension         Past Surgical History:   Procedure Laterality Date    ABDOMINOPLASTY       SECTION      x2    COLONOSCOPY      DR. NOLASCO, DR. DELGADILLO    COLONOSCOPY N/A 2022    Procedure: COLONOSCOPY FOR SCREENING;  Surgeon: Star Covington MD;  Location: [facility] ENDOSCOPY;  Service: Gastroenterology;  Laterality: N/A;  diverticulosis    MASTECTOMY Right     VENOUS ACCESS DEVICE (PORT) INSERTION AND REMOVAL           Visit Dx:     ICD-10-CM ICD-9-CM   1. Lymphedema  I89.0 457.1   2. Malignant neoplasm of upper-outer quadrant of right breast in female, estrogen receptor positive  C50.411 174.4    Z17.0 V86.0        Patient History       Row Name 25 1800             History    Chief Complaint --  Lymphedema of the right arm  -TD      Brief Description of Current Complaint Damaris is a 64 year old female with stage 3 lymphedema of the right arm.  Dmaaris had 22 lymphnodes removed during a right side mastecotmy secondary to breast cancer in .  -TD         Fall Risk Assessment    Any falls in the past year: No  -TD      Does patient have a fear of falling No  -TD         Services    Are you currently receiving Home Health services No  -TD      Do you plan to receive Home Health services in the near future No  -TD         Daily Activities    Primary  "Language English  -TD      Are you able to read Yes  -TD      Are you able to write Yes  -TD      How does patient learn best? Listening;Reading;Demonstration;Pictures/Video  -TD         Safety    Are you being hurt, hit, or frightened by anyone at home or in your life? No  -TD      Are you being neglected by a caregiver No  -TD      Have you had any of the following issues with N/A  -TD                User Key  (r) = Recorded By, (t) = Taken By, (c) = Cosigned By      Initials Name Provider Type    TD Jessica Ochoa OT Occupational Therapist                     Lymphedema       Row Name 02/04/25 1800             Subjective Pain    Able to rate subjective pain? yes  -TD      Pre-Treatment Pain Level 0  -TD      Post-Treatment Pain Level 0  -TD         Subjective    Subjective Comments Pt reports that she has had an exacerbation since a cellulitus  -TD         Lymphedema Assessment    Lymphedema Classification RUE:;stage 3 (Lymphostatic Elephantiasis)  -TD      Lymphedema Cancer Related Sx right;radical mastectomy;sentinel node biopsy  -TD      Lymphedema Surgery Comments 1998  -TD      Lymph Nodes Removed # 22  -TD         LLIS - Physical Concerns    The amount of pain associated with my lymphedema is: 0  -TD      The amount of limb heaviness associated with my lymphedema is: 4  -TD      The amount of skin tightness associated with my lymphedema is: 4  -TD      The size of my swollen limb(s) seems: 4  -TD      Lymphedema affects the movement of my swollen limb(s): 4  -TD      The strength in my swollen limb(s) is: 4  -TD         LLIS - Psychosocial Concerns    Lymphedema affects my body image (i.e., \"how I think I look\"). 4  -TD      Lymphedema affects my socializing with others. 4  -TD      Lymphedema affects my intimate relations with spouse or partner (rate 0 if not applicable 4  -TD      Lymphedema \"gets me down\" (i.e., depression, frustration, or anger) 4  -TD      I must rely on others for help due to my " lymphedema. 4  -TD      I know what to do to manage my lymphedema 4  -TD         LLIS - Functional Concerns    Lymphedema affects my ability to perform self-care activities (i.e. eating, dressing, hygiene) 4  -TD      Lymphedema affects my ability to perform routine home or work-related activities. 4  -TD      Lymphedema affects my performance of preferred leisure activities. 4  -TD      Lymphedema affects proper fit of clothing/shoes 4  -TD      Lymphedema affects my sleep 4  -TD         Posture/Observations    Posture- WNL Posture is WNL  -TD         General ROM    GENERAL ROM COMMENTS BUE are WFL  -TD         MMT (Manual Muscle Testing)    General MMT Comments BUE are WFL  -TD         Lymphedema Edema Assessment    Pitting Edema Severe  -TD      Edema Assessment Comment Pt presents with edema thorughout the RUE, it is espcially noted in the dorasal of the hand.  -TD         Skin Changes/Observations    Location/Assessment Upper Extremity  -TD      Upper Extremity Conditions right:;normal;intact;clean;hairless  -TD      Upper Extremity Color/Pigment right:;normal  -TD      Skin Observations Comment Pt reports that she has had instances of weeping on the back of the hand but not present today.  Pt had pitting throughout the right arm.  -TD         L-Dex Bioimpedence Screening    L-Dex Measurement Extremity RUE  -TD      L-Dex Patient Position Standing  -TD      L-Dex UE Dominate Side Right  -TD      L-Dex UE At Risk Side Right  -TD      L-Dex UE Pre Surgical Value No  -TD      L-Dex UE Score 60.4  -TD      L-Dex UE Baseline Score 60.4  -TD      L-Dex UE Value Change 0  -TD      L-Dex UE Comment baseline  -TD      $ L-Dex Charge yes  -TD         Lymphedema Life Impact Scale Totals    A.  Total Q1 - Q17 (Do not include Q18) 64  -TD      B.  Total number of questions answered (Q1-Q17) 17  -TD      C. Divide A by B 3.76  -TD      D. Multiple C by 25 94  -TD                User Key  (r) = Recorded By, (t) = Taken By,  (c) = Cosigned By      Initials Name Provider Type    Jessica Esqueda, OT Occupational Therapist                     OT Ortho       Row Name 02/04/25 1800             Orthotics & Prosthetics Management    Orthosis Location upper extremity orthosis  Medi circaid Juxtafit essential arm wrap size 3; Medi circaid reduction glove size: medium  -TD      Additional Documentation Orthosis Location (Row)  -TD                User Key  (r) = Recorded By, (t) = Taken By, (c) = Cosigned By      Initials Name Provider Type    Jessica Esqueda OT Occupational Therapist                    1. Post Breast Surgery Care/at risk for Lymphedema  LTG 1: 90 days:  As an indicator of no exacerbation of lymphedema staging, the patient will present with an L-Dex score less than [10] points from preoperative baseline.   STATUS: New  STG 1a:   30 days: To prevent exacerbation of mixed edema to lymphedema, patient will utilize the 2 postsurgical compression garments daily.      STATUS: New  STG 1b: 30 days: Patient will be independent with self-manual lymphatic massage.    STATUS: New  STG 1c: 30 days:  Patient will be independent with identification of signs and symptoms of lymphedema exasperation per stoplight to recovery education handout.   STATUS: New  STG 1 d: 30 days: Patient will be independent with HEP to prevent advancement in lymphedema staging.   STATUS: New  TREATMENT:  Self Care/ADL retraining, Therapeutic Activity, Neuromuscular Re-education, Therapeutic Exercise, Bioimpedence Fluid Analysis, Post-Surgical compression garement [address] [facility] Becky Camisole Kit 2860K, Orthotic Management and training,  and Manual Therapy.     Therapy Education  Education Details: Discussed with patient different therapeutic technique and treatment options.  Discussed options and what would be best to fit into her life.  Given: HEP, Symptoms/condition management, Pain management, Edema management  Program: New  How Provided: Verbal,  Demonstration, Written  Provided to: Patient  Level of Understanding: Teach back education performed, Verbalized, Demonstrated         OT Goals       Row Name 02/04/25 4821          Time Calculation    OT Goal Re-Cert Due Date 03/06/25  -TD               User Key  (r) = Recorded By, (t) = Taken By, (c) = Cosigned By      Initials Name Provider Type    Jessica Esqueda, HELLEN Occupational Therapist                  1. Post Breast Surgery Care/at risk for Lymphedema  LTG 1: 90 days:  As an indicator of no exacerbation of lymphedema staging, the patient will present with an L-Dex score less than [10] points from preoperative baseline.   STATUS: New  STG 1a:   30 days: To prevent exacerbation of mixed edema to lymphedema, patient will utilize the 2 postsurgical compression garments daily.      STATUS: New  STG 1b: 30 days: Patient will be independent with self-manual lymphatic massage.    STATUS: New  STG 1c: 30 days:  Patient will be independent with identification of signs and symptoms of lymphedema exasperation per stoplight to recovery education handout.   STATUS: New  STG 1 d: 30 days: Patient will be independent with HEP to prevent advancement in lymphedema staging.   STATUS: New  TREATMENT:  Self Care/ADL retraining, Therapeutic Activity, Neuromuscular Re-education, Therapeutic Exercise, Bioimpedence Fluid Analysis, Post-Surgical compression garement [address] [employer] [facility]/ Becky Camisole Kit 2860K, Orthotic Management and training,  and Manual Therapy.    OT Assessment/Plan       Row Name 02/04/25 1814          OT Assessment    Functional Limitations Decreased safety during functional activities;Limitations in functional capacity and performance;Performance in leisure activities;Limitation in home management;Performance in self-care ADL  -TD     Impairments Impaired lymphatic circulation  -TD     Assessment Comments Damaris is a 64 year old female with stage 3 lymphedema of the right arm. Damaris had 22 lymphnodes  removed during a right side mastecotmy secondary to breast cancer in 1998.  Damaris presents today with a severe lymphedema exacerbation seconday to a cellulitis exacerbation.  The patient reports she has not been able to wear current garnamants due to swelling during the infection.  The patient will benefit from continued skilled OT to prevent increased staging of lympehdema, increased pain, and decreased AROM.  -TD     OT Diagnosis lymphedema  -TD     OT Rehab Potential Good  -TD     Patient/caregiver participated in establishment of treatment plan and goals Yes  -TD     Patient would benefit from skilled therapy intervention Yes  -TD        OT Plan    OT Frequency 2x/week;3x/week  -TD     Predicted Duration of Therapy Intervention (OT) 6 weeks  -TD     Planned CPT's? OT EVAL LOW COMPLEXITY: 20934;OT RE-EVAL: 68973;OT THER ACT EA 15 MIN: 43288WE;OT THER PROC EA 15 MIN: 41348OW;OT SELF CARE/MGMT/TRAIN 15 MIN: 78337;OT MANUAL THERAPY EA 15 MIN: 93858;OT BIS XTRACELL FLUID ANALYSIS: 95446;OT CARE PLAN EA 15 MIN;OT ORTHOTIC MGMT/TRAIN EA 15 MIN: 25639;OT ORTHO/PROSTHET CHECKOUT EA 15 MIN: 94720  -TD     Planned Therapy Interventions (Optional Details) home exercise program;manual therapy techniques;orthotic fitting/training;patient/family education;prosthetic fitting/training;ROM (Range of Motion);strengthening;stretching  -TD     OT Plan Comments Initiate POC  -TD               User Key  (r) = Recorded By, (t) = Taken By, (c) = Cosigned By      Initials Name Provider Type    Jessica Esqueda OT Occupational Therapist                  OT eval complexity:   Examination:   Performance Deficits include:  dressing, bathing, grooming   # deficits affecting performance:  3  Decision Making:   Treatment Options Considered : adaption, remediation, prevention  # Treatment options considered : 3  Modification Made for: environment, task   Level of Task Modification: min/mod  Comorbidity Affect Performance: none  How is  performance affected: n/a  Evaluation Level Determined:  low             Time Calculation:   Untimed Charges  OT Eval/Re-eval Minutes: 90  Total Minutes  Untimed Charges Total Minutes: 90   Total Minutes: 90     Therapy Charges for Today       Code Description Service Date Service Provider Modifiers Qty    21659535158 HC PT BIS XTRACELL FLUID ANALYSIS 2/4/2025 Jessica Ochoa OT  1    35288703019 HC OT EVAL LOW COMPLEXITY 4 2/4/2025 Jessica Ochoa OT GO 1                      Jessica Ochoa OT  2/4/2025

## 2025-02-11 ENCOUNTER — APPOINTMENT (OUTPATIENT)
Facility: HOSPITAL | Age: 65
End: 2025-02-11
Payer: COMMERCIAL

## 2025-02-13 ENCOUNTER — HOSPITAL ENCOUNTER (OUTPATIENT)
Facility: HOSPITAL | Age: 65
Setting detail: THERAPIES SERIES
Discharge: HOME | End: 2025-02-13
Payer: COMMERCIAL

## 2025-02-13 DIAGNOSIS — C50.411 MALIGNANT NEOPLASM OF UPPER-OUTER QUADRANT OF RIGHT BREAST IN FEMALE, ESTROGEN RECEPTOR POSITIVE: ICD-10-CM

## 2025-02-13 DIAGNOSIS — Z17.0 MALIGNANT NEOPLASM OF UPPER-OUTER QUADRANT OF RIGHT BREAST IN FEMALE, ESTROGEN RECEPTOR POSITIVE: ICD-10-CM

## 2025-02-13 DIAGNOSIS — I89.0 LYMPHEDEMA: Primary | ICD-10-CM

## 2025-02-13 PROCEDURE — 97140 MANUAL THERAPY 1/> REGIONS: CPT | Performed by: OCCUPATIONAL THERAPIST

## 2025-02-13 NOTE — THERAPY TREATMENT NOTE
Outpatient Occupational Therapy Lymphedema Treatment Note   [facility]     Patient Name: Damaris Andrews  : 1960  MRN: [id number]  Today's Date: 2025      Visit Date: 2025    Patient Active Problem List   Diagnosis    Right thigh pain    Lipoma of left thigh    Arthritis    Breast cancer    High blood pressure    Hypothyroidism    Vaginal atrophy    Heart valve disorder    Women's annual routine gynecological examination    Primary insomnia        Past Medical History:   Diagnosis Date    Breast cancer     RIGHT MASECTOMY    Disease of thyroid gland     Drug therapy     Frequent nosebleeds     Hx of radiation therapy     Hypertension         Past Surgical History:   Procedure Laterality Date    ABDOMINOPLASTY       SECTION      x2    COLONOSCOPY      DR. NOLASCO, DR. DELGADILLO    COLONOSCOPY N/A 2022    Procedure: COLONOSCOPY FOR SCREENING;  Surgeon: Star Covington MD;  Location: [facility] ENDOSCOPY;  Service: Gastroenterology;  Laterality: N/A;  diverticulosis    MASTECTOMY Right     VENOUS ACCESS DEVICE (PORT) INSERTION AND REMOVAL           Visit Dx:      ICD-10-CM ICD-9-CM   1. Lymphedema  I89.0 457.1   2. Malignant neoplasm of upper-outer quadrant of right breast in female, estrogen receptor positive  C50.411 174.4    Z17.0 V86.0        Lymphedema       Row Name 25 0900             Subjective Pain    Able to rate subjective pain? yes  -TD      Pre-Treatment Pain Level 0  -TD      Post-Treatment Pain Level 0  -TD         Subjective    Subjective Comments Pt is ready to begin treatment  -TD         Lymphedema Assessment    Lymphedema Classification RUE:;stage 3 (Lymphostatic Elephantiasis)  -TD      Lymphedema Cancer Related Sx right;radical mastectomy;sentinel node biopsy  -TD      Lymphedema Surgery Comments 1998  -TD      Lymph Nodes Removed # 22  -TD      Positive Lymph Nodes # 18  -TD         Manual Lymphatic Drainage    Manual Lymphatic Drainage initial  sequence;opened regional lymph nodes;opened anastamoses;extremity treatment  -TD      Initial Sequence short neck;cervical;supraclavicular;shoulder collectors;abdomen  -TD      Cervical right;left  -TD      Supraclavicular right;left  -TD      Shoulder Collectors right;left  -TD      Abdomen superficial  -TD      Opened Regional Lymph Nodes axillary;inguinal;ribs;paraspinal  -TD      Axillary right;left  -TD      Inguinal right  -TD      Opened Anastamoses anterior axillo-axillary;posterior axillo-axillary;axillo-inguinal  -TD      Extremity Treatment MLD to full limb  -TD      Manual Therapy Pt tolerated traetment well.  -TD         Compression/Skin Care    Compression/Skin Care skin care;wrapping location;bandaging  -TD      Skin Care lotion applied  -TD      Wrapping Location upper extremity  -TD      Wrapping Location UE right:;hand to axilla  -TD      Bandage Layers cotton liner;padding/fluff layer;full limb;short-stretch bandages (comment size/quantity)  -TD      Bandaging Technique circumferential/spiral;moderate compression  -TD      Compression/Skin Care Comments Pt wore glove on hand  -TD                User Key  (r) = Recorded By, (t) = Taken By, (c) = Cosigned By      Initials Name Provider Type    TD Jessica Ochoa OT Occupational Therapist                             OT Assessment/Plan       Row Name 02/13/25 0926          OT Assessment    Functional Limitations Decreased safety during functional activities;Limitations in functional capacity and performance;Performance in leisure activities;Limitation in home management;Performance in self-care ADL  -TD     Impairments Impaired lymphatic circulation  -TD     Assessment Comments Damaris is a 64 year old female with stage 3 lymphedema of the right arm. Damaris had 22 lymphnodes removed during a right side mastecotmy secondary to breast cancer in 1998. Pt tolerated treatment well this date.  Pt was educted on wrapping.  The patient will benefit from continued  skilled OT to prevent increased staging of lympehdema, increased pain, and decreased AROM.  -TD     OT Diagnosis lymphedema  -TD     OT Rehab Potential Good  -TD     Patient/caregiver participated in establishment of treatment plan and goals Yes  -TD     Patient would benefit from skilled therapy intervention Yes  -TD        OT Plan    OT Plan Comments continue POC  -TD               User Key  (r) = Recorded By, (t) = Taken By, (c) = Cosigned By      Initials Name Provider Type    Jessica Esqueda OT Occupational Therapist                        Manual Rx (Last 36 Hours)       Manual Treatments       Row Name 02/13/25 0927             Total Minutes    34694 - OT Manual Therapy Minutes 45  -TD                User Key  (r) = Recorded By, (t) = Taken By, (c) = Cosigned By      Initials Name Provider Type    TD Jessica Ochoa OT Occupational Therapist                      Therapy Education  Education Details: Pt was educated on MLD and wrapping  Given: HEP, Symptoms/condition management, Pain management, Edema management  Program: New  How Provided: Verbal, Demonstration  Provided to: Patient  Level of Understanding: Teach back education performed, Verbalized, Demonstrated                Time Calculation:   Timed Charges  08229 - OT Manual Therapy Minutes: 45  Total Minutes  Timed Charges Total Minutes: 45   Total Minutes: 45     Therapy Charges for Today       Code Description Service Date Service Provider Modifiers Qty    30771738670 HC OT MANUAL THERAPY EA 15 MIN 2/13/2025 Jessica Ochoa OT GO 3                        Jessica Ochoa OT  2/13/2025

## 2025-02-18 ENCOUNTER — HOSPITAL ENCOUNTER (OUTPATIENT)
Facility: HOSPITAL | Age: 65
Setting detail: THERAPIES SERIES
Discharge: HOME | End: 2025-02-18
Payer: COMMERCIAL

## 2025-02-18 DIAGNOSIS — C50.411 MALIGNANT NEOPLASM OF UPPER-OUTER QUADRANT OF RIGHT BREAST IN FEMALE, ESTROGEN RECEPTOR POSITIVE: ICD-10-CM

## 2025-02-18 DIAGNOSIS — I89.0 LYMPHEDEMA: Primary | ICD-10-CM

## 2025-02-18 DIAGNOSIS — Z17.0 MALIGNANT NEOPLASM OF UPPER-OUTER QUADRANT OF RIGHT BREAST IN FEMALE, ESTROGEN RECEPTOR POSITIVE: ICD-10-CM

## 2025-02-18 PROCEDURE — 97140 MANUAL THERAPY 1/> REGIONS: CPT

## 2025-02-18 NOTE — THERAPY TREATMENT NOTE
Outpatient Occupational Therapy Lymphedema Treatment Note   [facility]     Patient Name: Damaris Andrews  : 1960  MRN: [id number]  Today's Date: 2025      Visit Date: 2025    Patient Active Problem List   Diagnosis    Right thigh pain    Lipoma of left thigh    Arthritis    Breast cancer    High blood pressure    Hypothyroidism    Vaginal atrophy    Heart valve disorder    Women's annual routine gynecological examination    Primary insomnia        Past Medical History:   Diagnosis Date    Breast cancer     RIGHT MASECTOMY    Disease of thyroid gland     Drug therapy     Frequent nosebleeds     Hx of radiation therapy     Hypertension         Past Surgical History:   Procedure Laterality Date    ABDOMINOPLASTY       SECTION      x2    COLONOSCOPY      DR. NOLASCO, DR. DELGADILLO    COLONOSCOPY N/A 2022    Procedure: COLONOSCOPY FOR SCREENING;  Surgeon: Star Covington MD;  Location: [facility] ENDOSCOPY;  Service: Gastroenterology;  Laterality: N/A;  diverticulosis    MASTECTOMY Right     VENOUS ACCESS DEVICE (PORT) INSERTION AND REMOVAL           Visit Dx:      ICD-10-CM ICD-9-CM   1. Lymphedema  I89.0 457.1   2. Malignant neoplasm of upper-outer quadrant of right breast in female, estrogen receptor positive  C50.411 174.4    Z17.0 V86.0        Lymphedema       Row Name 25 0800             Subjective Pain    Able to rate subjective pain? yes  -SF      Pre-Treatment Pain Level 0  -SF      Post-Treatment Pain Level 0  -SF         Subjective    Subjective Comments Patient reports she thinks the wrapping was very successful.  -SF         Lymphedema Assessment    Lymphedema Classification RUE:;stage 3 (Lymphostatic Elephantiasis)  -SF      Lymphedema Cancer Related Sx right;radical mastectomy;sentinel node biopsy  -SF      Lymphedema Surgery Comments 1998  -SF      Lymph Nodes Removed # 22  -SF      Positive Lymph Nodes # 18  -SF         Manual Lymphatic Drainage    Manual Lymphatic  Drainage initial sequence;opened regional lymph nodes;opened anastamoses;extremity treatment  -SF      Initial Sequence short neck;cervical;supraclavicular;shoulder collectors;abdomen  -SF      Cervical right;left  -SF      Supraclavicular right;left  -SF      Shoulder Collectors right;left  -SF      Abdomen superficial  -SF      Opened Regional Lymph Nodes axillary;inguinal;ribs;paraspinal  -SF      Axillary right;left  -SF      Inguinal right  -SF      Opened Anastamoses anterior axillo-axillary;posterior axillo-axillary;axillo-inguinal  -SF      Extremity Treatment MLD to full limb  -SF      Manual Therapy PORi protocol utilized - Trigger point release provided throughout RUE with muscle manipulation. Therapist also providing trigger point release to pec minor and major, upper trap, lateral trunk and posteriorly at upper trap and around scapula border  -SF         Compression/Skin Care    Compression/Skin Care skin care;wrapping location;bandaging  -SF      Skin Care lotion applied  -SF      Wrapping Location upper extremity  -SF      Wrapping Location UE right:;hand to axilla  -SF      Bandage Layers cotton liner;padding/fluff layer;full limb;short-stretch bandages (comment size/quantity)  -SF      Bandaging Technique circumferential/spiral;moderate compression  -SF      Compression/Skin Care Comments Patient utilizing glove on R hand  -SF                User Key  (r) = Recorded By, (t) = Taken By, (c) = Cosigned By      Initials Name Provider Type    Prema Mahoney OT Occupational Therapist                             OT Assessment/Plan       Row Name 02/18/25 1127          OT Assessment    Functional Limitations Decreased safety during functional activities;Limitations in functional capacity and performance;Performance in leisure activities;Limitation in home management;Performance in self-care ADL  -SF     Impairments Impaired lymphatic circulation  -SF     Assessment Comments Damaris is a 64 year old female  with stage 3 lymphedema of the right arm. Damaris had 22 lymphnodes removed during a right side mastecotmy secondary to breast cancer in 1998.  Patient tolerated treatment well on this date and will continue to benefit from skilled occupational therapy to facilitate a reduction of her right upper extremity lymphedema until a plateau is achieved, patient has obtained permanent compression garments and is independent with complete decongestive therapy.  -SF     OT Diagnosis Lymphedema  -SF     OT Rehab Potential Good  -SF     Patient/caregiver participated in establishment of treatment plan and goals Yes  -SF     Patient would benefit from skilled therapy intervention Yes  -SF        OT Plan    OT Plan Comments Continue POC  -SF               User Key  (r) = Recorded By, (t) = Taken By, (c) = Cosigned By      Initials Name Provider Type    Prema Mahoney OT Occupational Therapist                        Manual Rx (Last 36 Hours)       Manual Treatments       Row Name 02/18/25 1128             Total Minutes    29446 - OT Manual Therapy Minutes 40  -SF                User Key  (r) = Recorded By, (t) = Taken By, (c) = Cosigned By      Initials Name Provider Type    Prema Mahoney OT Occupational Therapist                                       Time Calculation:   Timed Charges  37513 - OT Manual Therapy Minutes: 40  Total Minutes  Timed Charges Total Minutes: 40   Total Minutes: 40     Therapy Charges for Today       Code Description Service Date Service Provider Modifiers Qty    [id number]  OT MANUAL THERAPY EA 15 MIN 2/18/2025 Prema Schwartz OT GO 3                        Prema Schwartz OT  2/18/2025

## 2025-02-20 ENCOUNTER — TELEPHONE (OUTPATIENT)
Facility: HOSPITAL | Age: 65
End: 2025-02-20
Payer: COMMERCIAL

## 2025-02-20 ENCOUNTER — HOSPITAL ENCOUNTER (OUTPATIENT)
Facility: HOSPITAL | Age: 65
Setting detail: THERAPIES SERIES
Discharge: HOME | End: 2025-02-20
Payer: COMMERCIAL

## 2025-02-20 DIAGNOSIS — Z17.0 MALIGNANT NEOPLASM OF UPPER-OUTER QUADRANT OF RIGHT BREAST IN FEMALE, ESTROGEN RECEPTOR POSITIVE: ICD-10-CM

## 2025-02-20 DIAGNOSIS — C50.411 MALIGNANT NEOPLASM OF UPPER-OUTER QUADRANT OF RIGHT BREAST IN FEMALE, ESTROGEN RECEPTOR POSITIVE: ICD-10-CM

## 2025-02-20 DIAGNOSIS — I89.0 LYMPHEDEMA: Primary | ICD-10-CM

## 2025-02-20 PROCEDURE — 97140 MANUAL THERAPY 1/> REGIONS: CPT | Performed by: OCCUPATIONAL THERAPIST

## 2025-02-20 NOTE — THERAPY TREATMENT NOTE
Outpatient Occupational Therapy Lymphedema Treatment Note   [facility]     Patient Name: Damaris Andrews  : 1960  MRN: [id number]  Today's Date: 2025      Visit Date: 2025    Patient Active Problem List   Diagnosis    Right thigh pain    Lipoma of left thigh    Arthritis    Breast cancer    High blood pressure    Hypothyroidism    Vaginal atrophy    Heart valve disorder    Women's annual routine gynecological examination    Primary insomnia        Past Medical History:   Diagnosis Date    Breast cancer     RIGHT MASECTOMY    Disease of thyroid gland     Drug therapy     Frequent nosebleeds     Hx of radiation therapy     Hypertension         Past Surgical History:   Procedure Laterality Date    ABDOMINOPLASTY       SECTION      x2    COLONOSCOPY      DR. NOLASCO, DR. DELGADILLO    COLONOSCOPY N/A 2022    Procedure: COLONOSCOPY FOR SCREENING;  Surgeon: Star Covington MD;  Location: [facility] ENDOSCOPY;  Service: Gastroenterology;  Laterality: N/A;  diverticulosis    MASTECTOMY Right     VENOUS ACCESS DEVICE (PORT) INSERTION AND REMOVAL           Visit Dx:      ICD-10-CM ICD-9-CM   1. Lymphedema  I89.0 457.1   2. Malignant neoplasm of upper-outer quadrant of right breast in female, estrogen receptor positive  C50.411 174.4    Z17.0 V86.0        Lymphedema       Row Name 25 0900             Subjective Pain    Able to rate subjective pain? yes  -TD      Pre-Treatment Pain Level 0  -TD      Post-Treatment Pain Level 0  -TD         Subjective    Subjective Comments Pt reports progress towards decreasing volume  -TD         Lymphedema Assessment    Lymphedema Classification RUE:;stage 3 (Lymphostatic Elephantiasis)  -TD      Lymphedema Cancer Related Sx right;radical mastectomy;sentinel node biopsy  -TD      Lymphedema Surgery Comments 1998  -TD      Lymph Nodes Removed # 22  -TD      Positive Lymph Nodes # 18  -TD         Manual Lymphatic Drainage    Manual Lymphatic Drainage  initial sequence;opened regional lymph nodes;opened anastamoses;extremity treatment  -TD      Initial Sequence short neck;cervical;supraclavicular;shoulder collectors;abdomen  -TD      Cervical right;left  -TD      Supraclavicular right;left  -TD      Shoulder Collectors right;left  -TD      Abdomen superficial  -TD      Opened Regional Lymph Nodes axillary;inguinal;ribs;paraspinal  -TD      Axillary right;left  -TD      Inguinal right  -TD      Opened Anastamoses anterior axillo-axillary;posterior axillo-axillary;axillo-inguinal  -TD      Extremity Treatment MLD to full limb  -TD      Manual Therapy PORI protocol to address TP release and MLD  -TD         Compression/Skin Care    Compression/Skin Care skin care;wrapping location;bandaging  -TD      Skin Care lotion applied  -TD      Wrapping Location upper extremity  -TD      Wrapping Location UE right:;fingers to axilla  -TD      Bandage Layers cotton liner;padding/fluff layer;full limb;short-stretch bandages (comment size/quantity)  -TD      Bandaging Technique circumferential/spiral;moderate compression  -TD                User Key  (r) = Recorded By, (t) = Taken By, (c) = Cosigned By      Initials Name Provider Type    TD Jessica Ochoa OT Occupational Therapist                             OT Assessment/Plan       Row Name 02/20/25 0931          OT Assessment    Functional Limitations Decreased safety during functional activities;Limitations in functional capacity and performance;Performance in leisure activities;Limitation in home management;Performance in self-care ADL  -TD     Impairments Impaired lymphatic circulation  -TD     Assessment Comments Damaris is a 64 year old female with stage 3 lymphedema of the right arm. Damaris had 22 lymphnodes removed during a right side mastecotmy secondary to breast cancer in 1998.  Pt reports she see good progress towards goals and is tolerating it well.  Pt will continue to benefit from skilled occupational therapy to  facilitate a reduction of her right upper extremity lymphedema until a plateau is achieved, patient has obtained permanent compression garments and is independent with complete decongestive therapy.  -TD     OT Diagnosis lymphedema  -TD     OT Rehab Potential Good  -TD     Patient/caregiver participated in establishment of treatment plan and goals Yes  -TD     Patient would benefit from skilled therapy intervention Yes  -TD        OT Plan    OT Plan Comments continue POC  -TD               User Key  (r) = Recorded By, (t) = Taken By, (c) = Cosigned By      Initials Name Provider Type    Jessica Esqueda OT Occupational Therapist                        Manual Rx (Last 36 Hours)       Manual Treatments       Row Name 02/20/25 0933             Total Minutes    61506 - OT Manual Therapy Minutes 55  -TD                User Key  (r) = Recorded By, (t) = Taken By, (c) = Cosigned By      Initials Name Provider Type    Jessica Esqueda OT Occupational Therapist                      Therapy Education  Education Details: Pt was educated on MLD and wrapping  Given: HEP, Symptoms/condition management, Pain management, Edema management  Program: New  How Provided: Verbal, Demonstration  Provided to: Patient  Level of Understanding: Teach back education performed, Verbalized, Demonstrated                Time Calculation:   Timed Charges  32880 - OT Manual Therapy Minutes: 55  Total Minutes  Timed Charges Total Minutes: 55   Total Minutes: 55     Therapy Charges for Today       Code Description Service Date Service Provider Modifiers Qty    [id number]  OT MANUAL THERAPY EA 15 MIN 2/20/2025 Jessica Ochoa OT GO 4                        Jessica Ochoa OT  2/20/2025

## 2025-02-20 NOTE — TELEPHONE ENCOUNTER
Please sign Rx for patient to obtain compression garment to help manage her lymphedema independently. Thank you!

## 2025-02-24 ENCOUNTER — APPOINTMENT (OUTPATIENT)
Facility: HOSPITAL | Age: 65
End: 2025-02-24
Payer: COMMERCIAL

## 2025-02-26 ENCOUNTER — HOSPITAL ENCOUNTER (OUTPATIENT)
Facility: HOSPITAL | Age: 65
Setting detail: THERAPIES SERIES
Discharge: HOME | End: 2025-02-26
Payer: COMMERCIAL

## 2025-02-26 DIAGNOSIS — C50.411 MALIGNANT NEOPLASM OF UPPER-OUTER QUADRANT OF RIGHT BREAST IN FEMALE, ESTROGEN RECEPTOR POSITIVE: ICD-10-CM

## 2025-02-26 DIAGNOSIS — I89.0 LYMPHEDEMA: Primary | ICD-10-CM

## 2025-02-26 DIAGNOSIS — Z17.0 MALIGNANT NEOPLASM OF UPPER-OUTER QUADRANT OF RIGHT BREAST IN FEMALE, ESTROGEN RECEPTOR POSITIVE: ICD-10-CM

## 2025-02-26 PROCEDURE — 97140 MANUAL THERAPY 1/> REGIONS: CPT | Performed by: OCCUPATIONAL THERAPIST

## 2025-02-26 NOTE — THERAPY TREATMENT NOTE
Outpatient Occupational Therapy Lymphedema Treatment Note   [facility]     Patient Name: Damaris Andrews  : 1960  MRN: [id number]  Today's Date: 2025      Visit Date: 2025    Patient Active Problem List   Diagnosis    Right thigh pain    Lipoma of left thigh    Arthritis    Breast cancer    High blood pressure    Hypothyroidism    Vaginal atrophy    Heart valve disorder    Women's annual routine gynecological examination    Primary insomnia        Past Medical History:   Diagnosis Date    Breast cancer     RIGHT MASECTOMY    Disease of thyroid gland     Drug therapy     Frequent nosebleeds     Hx of radiation therapy     Hypertension         Past Surgical History:   Procedure Laterality Date    ABDOMINOPLASTY       SECTION      x2    COLONOSCOPY      DR. NOLASCO, DR. DELGADILLO    COLONOSCOPY N/A 2022    Procedure: COLONOSCOPY FOR SCREENING;  Surgeon: Star Covington MD;  Location: [facility] ENDOSCOPY;  Service: Gastroenterology;  Laterality: N/A;  diverticulosis    MASTECTOMY Right     VENOUS ACCESS DEVICE (PORT) INSERTION AND REMOVAL           Visit Dx:      ICD-10-CM ICD-9-CM   1. Lymphedema  I89.0 457.1   2. Malignant neoplasm of upper-outer quadrant of right breast in female, estrogen receptor positive  C50.411 174.4    Z17.0 V86.0        Lymphedema       Row Name 25 1500             Subjective Pain    Able to rate subjective pain? yes  -TD      Pre-Treatment Pain Level 0  -TD      Post-Treatment Pain Level 0  -TD         Subjective    Subjective Comments Pt reports she is making good progres  -TD         Lymphedema Assessment    Lymphedema Classification RUE:;stage 3 (Lymphostatic Elephantiasis)  -TD      Lymphedema Cancer Related Sx right;radical mastectomy;sentinel node biopsy  -TD      Lymphedema Surgery Comments 1998  -TD      Lymph Nodes Removed # 22  -TD      Positive Lymph Nodes # 18  -TD         Manual Lymphatic Drainage    Manual Lymphatic Drainage initial  sequence;opened regional lymph nodes;opened anastamoses;extremity treatment  -TD      Initial Sequence short neck;cervical;supraclavicular;shoulder collectors;abdomen  -TD      Cervical right;left  -TD      Supraclavicular right;left  -TD      Shoulder Collectors right;left  -TD      Abdomen superficial  -TD      Opened Regional Lymph Nodes axillary;inguinal;ribs;paraspinal  -TD      Axillary right;left  -TD      Inguinal right  -TD      Opened Anastamoses anterior axillo-axillary;posterior axillo-axillary;axillo-inguinal  -TD      Extremity Treatment MLD to full limb  -TD      Manual Therapy Therapist completed PORI protocol  -TD         Compression/Skin Care    Compression/Skin Care skin care;wrapping location;bandaging  -TD      Skin Care lotion applied  -TD      Wrapping Location upper extremity  -TD      Wrapping Location UE right:;fingers to axilla  -TD      Bandage Layers cotton liner;padding/fluff layer;full limb;short-stretch bandages (comment size/quantity)  -TD      Bandaging Technique circumferential/spiral;moderate compression  -TD                User Key  (r) = Recorded By, (t) = Taken By, (c) = Cosigned By      Initials Name Provider Type    TD Jessica Ochoa OT Occupational Therapist                             OT Assessment/Plan       Row Name 02/26/25 0608          OT Assessment    Functional Limitations Decreased safety during functional activities;Limitations in functional capacity and performance;Performance in leisure activities;Limitation in home management;Performance in self-care ADL  -TD     Impairments Impaired lymphatic circulation  -TD     Assessment Comments Damaris is a 64 year old female with stage 3 lymphedema of the right arm. Damaris had 22 lymphnodes removed during a right side mastecotmy secondary to breast cancer in 1998. Pt reports she is feeling much better since beginning therapy. Pt will continue to benefit from skilled occupational therapy to facilitate a reduction of her right  upper extremity lymphedema until a plateau is achieved, patient has obtained permanent compression garments and is independent with complete decongestive therapy.  -TD     OT Diagnosis lymphedema  -TD     OT Rehab Potential Good  -TD     Patient/caregiver participated in establishment of treatment plan and goals Yes  -TD     Patient would benefit from skilled therapy intervention Yes  -TD        OT Plan    OT Plan Comments continue POC  -TD               User Key  (r) = Recorded By, (t) = Taken By, (c) = Cosigned By      Initials Name Provider Type    Jessica Esqueda OT Occupational Therapist                        Manual Rx (Last 36 Hours)       Manual Treatments       Row Name 02/26/25 1542             Total Minutes    48769 - OT Manual Therapy Minutes 40  -TD                User Key  (r) = Recorded By, (t) = Taken By, (c) = Cosigned By      Initials Name Provider Type    Jessica Esqueda OT Occupational Therapist                      Therapy Education  Education Details: Pt was educated on MLD and wrapping  Given: HEP, Symptoms/condition management, Pain management, Edema management  Program: New  How Provided: Verbal, Demonstration  Provided to: Patient  Level of Understanding: Teach back education performed, Verbalized, Demonstrated                Time Calculation:   Timed Charges  97107 - OT Manual Therapy Minutes: 40  Total Minutes  Timed Charges Total Minutes: 40   Total Minutes: 40     Therapy Charges for Today       Code Description Service Date Service Provider Modifiers Qty    [id number]  OT MANUAL THERAPY EA 15 MIN 2/26/2025 Jessica Ochoa OT GO 3                        Jessica Ochoa OT  2/26/2025

## 2025-02-28 ENCOUNTER — HOSPITAL ENCOUNTER (OUTPATIENT)
Facility: HOSPITAL | Age: 65
Setting detail: THERAPIES SERIES
Discharge: HOME | End: 2025-02-28
Payer: COMMERCIAL

## 2025-02-28 DIAGNOSIS — Z17.0 MALIGNANT NEOPLASM OF UPPER-OUTER QUADRANT OF RIGHT BREAST IN FEMALE, ESTROGEN RECEPTOR POSITIVE: ICD-10-CM

## 2025-02-28 DIAGNOSIS — C50.411 MALIGNANT NEOPLASM OF UPPER-OUTER QUADRANT OF RIGHT BREAST IN FEMALE, ESTROGEN RECEPTOR POSITIVE: ICD-10-CM

## 2025-02-28 DIAGNOSIS — I89.0 LYMPHEDEMA: Primary | ICD-10-CM

## 2025-02-28 PROCEDURE — 97140 MANUAL THERAPY 1/> REGIONS: CPT | Performed by: OCCUPATIONAL THERAPIST

## 2025-02-28 NOTE — THERAPY TREATMENT NOTE
Outpatient Occupational Therapy Lymphedema Treatment Note   [facility]     Patient Name: Damaris Andrews  : 1960  MRN: [id number]  Today's Date: 2025      Visit Date: 2025    Patient Active Problem List   Diagnosis    Right thigh pain    Lipoma of left thigh    Arthritis    Breast cancer    High blood pressure    Hypothyroidism    Vaginal atrophy    Heart valve disorder    Women's annual routine gynecological examination    Primary insomnia        Past Medical History:   Diagnosis Date    Breast cancer     RIGHT MASECTOMY    Disease of thyroid gland     Drug therapy     Frequent nosebleeds     Hx of radiation therapy     Hypertension         Past Surgical History:   Procedure Laterality Date    ABDOMINOPLASTY       SECTION      x2    COLONOSCOPY      DR. NOLASCO, DR. DELGADILLO    COLONOSCOPY N/A 2022    Procedure: COLONOSCOPY FOR SCREENING;  Surgeon: Star Covington MD;  Location: [facility] ENDOSCOPY;  Service: Gastroenterology;  Laterality: N/A;  diverticulosis    MASTECTOMY Right     VENOUS ACCESS DEVICE (PORT) INSERTION AND REMOVAL           Visit Dx:      ICD-10-CM ICD-9-CM   1. Lymphedema  I89.0 457.1   2. Malignant neoplasm of upper-outer quadrant of right breast in female, estrogen receptor positive  C50.411 174.4    Z17.0 V86.0        Lymphedema       Row Name 25 1000             Subjective Pain    Able to rate subjective pain? yes  -TD      Pre-Treatment Pain Level 0  -TD      Post-Treatment Pain Level 0  -TD         Subjective    Subjective Comments Pt reports she is making good progress towards her goal  -TD         Lymphedema Assessment    Lymphedema Classification RUE:;stage 3 (Lymphostatic Elephantiasis)  -TD      Lymphedema Cancer Related Sx right;radical mastectomy;sentinel node biopsy  -TD      Lymphedema Surgery Comments 1998  -TD      Lymph Nodes Removed # 22  -TD      Positive Lymph Nodes # 18  -TD         Manual Lymphatic Drainage    Manual Lymphatic  Drainage initial sequence;opened regional lymph nodes;opened anastamoses;extremity treatment  -TD      Initial Sequence short neck;cervical;supraclavicular;shoulder collectors;abdomen  -TD      Cervical right;left  -TD      Supraclavicular right;left  -TD      Shoulder Collectors right;left  -TD      Abdomen superficial  -TD      Opened Regional Lymph Nodes axillary;inguinal;ribs;paraspinal  -TD      Axillary right;left  -TD      Inguinal right  -TD      Opened Anastamoses anterior axillo-axillary;posterior axillo-axillary;axillo-inguinal  -TD      Extremity Treatment MLD to full limb  -TD         Compression/Skin Care    Compression/Skin Care skin care;wrapping location;bandaging  -TD      Skin Care lotion applied  -TD      Wrapping Location upper extremity  -TD      Wrapping Location UE right:;fingers to axilla  -TD      Bandage Layers cotton liner;padding/fluff layer;full limb;short-stretch bandages (comment size/quantity)  -TD      Bandaging Technique circumferential/spiral;moderate compression  -TD                User Key  (r) = Recorded By, (t) = Taken By, (c) = Cosigned By      Initials Name Provider Type    TD Jessica Ochoa OT Occupational Therapist                             OT Assessment/Plan       Row Name 02/28/25 1022          OT Assessment    Functional Limitations Decreased safety during functional activities;Limitations in functional capacity and performance;Performance in leisure activities;Limitation in home management;Performance in self-care ADL  -TD     Impairments Impaired lymphatic circulation  -TD     Assessment Comments Damaris is a 64 year old female with stage 3 lymphedema of the right arm. Damaris had 22 lymphnodes removed during a right side mastecotmy secondary to breast cancer in 1998.  Pt tolerated treatment well.  Her  was present to learn HEP.   Pt will continue to benefit from skilled occupational therapy to facilitate a reduction of her right upper extremity lymphedema until a  plateau is achieved, patient has obtained permanent compression garments and is independent with complete decongestive therapy.  -TD     OT Diagnosis lymphedema  -TD        OT Plan    OT Plan Comments continue POC  -TD               User Key  (r) = Recorded By, (t) = Taken By, (c) = Cosigned By      Initials Name Provider Type    Jessica Esqueda, OT Occupational Therapist                        Manual Rx (Last 36 Hours)       Manual Treatments       Row Name 02/28/25 1023             Total Minutes    97247 - OT Manual Therapy Minutes 45  -TD                User Key  (r) = Recorded By, (t) = Taken By, (c) = Cosigned By      Initials Name Provider Type    TD Jessica Ochoa, OT Occupational Therapist                      Therapy Education  Education Details: Pt was given HEP for MLD and wrapping  Given: HEP, Symptoms/condition management, Pain management, Edema management  Program: New  How Provided: Verbal, Demonstration  Provided to: Patient, Other (comment)  Level of Understanding: Teach back education performed, Verbalized, Demonstrated                Time Calculation:   Timed Charges  25126 - OT Manual Therapy Minutes: 45  Total Minutes  Timed Charges Total Minutes: 45   Total Minutes: 45     Therapy Charges for Today       Code Description Service Date Service Provider Modifiers Qty    [id number]  OT MANUAL THERAPY EA 15 MIN 2/28/2025 Jessica Ochoa OT GO 3                        Jessica Ochoa OT  2/28/2025

## 2025-03-04 ENCOUNTER — APPOINTMENT (OUTPATIENT)
Facility: HOSPITAL | Age: 65
End: 2025-03-04
Payer: COMMERCIAL

## 2025-03-04 ENCOUNTER — HOSPITAL ENCOUNTER (OUTPATIENT)
Facility: HOSPITAL | Age: 65
Setting detail: THERAPIES SERIES
Discharge: HOME | End: 2025-03-04
Payer: COMMERCIAL

## 2025-03-04 DIAGNOSIS — C50.411 MALIGNANT NEOPLASM OF UPPER-OUTER QUADRANT OF RIGHT BREAST IN FEMALE, ESTROGEN RECEPTOR POSITIVE: ICD-10-CM

## 2025-03-04 DIAGNOSIS — I89.0 LYMPHEDEMA: Primary | ICD-10-CM

## 2025-03-04 DIAGNOSIS — Z17.0 MALIGNANT NEOPLASM OF UPPER-OUTER QUADRANT OF RIGHT BREAST IN FEMALE, ESTROGEN RECEPTOR POSITIVE: ICD-10-CM

## 2025-03-04 PROCEDURE — 97140 MANUAL THERAPY 1/> REGIONS: CPT | Performed by: OCCUPATIONAL THERAPIST

## 2025-03-04 NOTE — THERAPY RE-EVALUATION
Outpatient Occupational Therapy Lymphedema Re-Evaluation   [place]     Patient Name: Damaris Andrews  : 1960  MRN: [id number]  Today's Date: 3/4/2025      Visit Date: 2025    Patient Active Problem List   Diagnosis    Right thigh pain    Lipoma of left thigh    Arthritis    Breast cancer    High blood pressure    Hypothyroidism    Vaginal atrophy    Heart valve disorder    Women's annual routine gynecological examination    Primary insomnia        Past Medical History:   Diagnosis Date    Breast cancer     RIGHT MASECTOMY    Disease of thyroid gland     Drug therapy     Frequent nosebleeds     Hx of radiation therapy     Hypertension         Past Surgical History:   Procedure Laterality Date    ABDOMINOPLASTY       SECTION      x2    COLONOSCOPY      DR. NOLASCO, DR. DELGADILLO    COLONOSCOPY N/A 2022    Procedure: COLONOSCOPY FOR SCREENING;  Surgeon: Star Covington MD;  Location: [facility] ENDOSCOPY;  Service: Gastroenterology;  Laterality: N/A;  diverticulosis    MASTECTOMY Right     VENOUS ACCESS DEVICE (PORT) INSERTION AND REMOVAL           Visit Dx:     ICD-10-CM ICD-9-CM   1. Lymphedema  I89.0 457.1   2. Malignant neoplasm of upper-outer quadrant of right breast in female, estrogen receptor positive  C50.411 174.4    Z17.0 V86.0        Patient History       Row Name 25 1300             History    Chief Complaint --  Lymphedema of the right arm  -TD      Brief Description of Current Complaint Damaris is a 64 year old female with stage 3 lymphedema of the right arm.  Damaris had 22 lymphnodes removed during a right side mastecotmy secondary to breast cancer in .  -TD         Fall Risk Assessment    Any falls in the past year: No  -TD      Does patient have a fear of falling No  -TD         Services    Are you currently receiving Home Health services No  -TD      Do you plan to receive Home Health services in the near future No  -TD         Daily Activities    Primary Language  "English  -TD      Are you able to read Yes  -TD      Are you able to write Yes  -TD      How does patient learn best? Listening;Reading;Demonstration;Pictures/Video  -TD         Safety    Are you being hurt, hit, or frightened by anyone at home or in your life? No  -TD      Are you being neglected by a caregiver No  -TD      Have you had any of the following issues with N/A  -TD                User Key  (r) = Recorded By, (t) = Taken By, (c) = Cosigned By      Initials Name Provider Type    TD Jessica Ochoa OT Occupational Therapist                     Lymphedema       Row Name 03/04/25 1300             Subjective Pain    Able to rate subjective pain? yes  -TD      Pre-Treatment Pain Level 0  -TD      Post-Treatment Pain Level 0  -TD         Subjective    Subjective Comments Patient reports good progress.  -TD         Lymphedema Assessment    Lymphedema Classification RUE:;stage 3 (Lymphostatic Elephantiasis)  -TD      Lymphedema Cancer Related Sx right;radical mastectomy;sentinel node biopsy  -TD      Lymph Nodes Removed # 22  -TD      Positive Lymph Nodes # 18  -TD         LLIS - Physical Concerns    The amount of pain associated with my lymphedema is: 0  -TD      The amount of limb heaviness associated with my lymphedema is: 3  -TD      The amount of skin tightness associated with my lymphedema is: 3  -TD      The size of my swollen limb(s) seems: 3  -TD      Lymphedema affects the movement of my swollen limb(s): 3  -TD      The strength in my swollen limb(s) is: 3  -TD         LLIS - Psychosocial Concerns    Lymphedema affects my body image (i.e., \"how I think I look\"). 4  -TD      Lymphedema affects my socializing with others. 4  -TD      Lymphedema affects my intimate relations with spouse or partner (rate 0 if not applicable 4  -TD      Lymphedema \"gets me down\" (i.e., depression, frustration, or anger) 4  -TD      I must rely on others for help due to my lymphedema. 4  -TD      I know what to do to manage " my lymphedema 4  -TD         LLIS - Functional Concerns    Lymphedema affects my ability to perform self-care activities (i.e. eating, dressing, hygiene) 4  -TD      Lymphedema affects my ability to perform routine home or work-related activities. 4  -TD      Lymphedema affects my performance of preferred leisure activities. 4  -TD      Lymphedema affects proper fit of clothing/shoes 4  -TD      Lymphedema affects my sleep 4  -TD         Posture/Observations    Posture- WNL Posture is WNL  -TD         General ROM    GENERAL ROM COMMENTS BUE WFL  -TD         MMT (Manual Muscle Testing)    General MMT Comments BUE WFL  -TD         Manual Lymphatic Drainage    Manual Lymphatic Drainage initial sequence;opened regional lymph nodes;opened anastamoses;extremity treatment  -TD      Initial Sequence short neck;cervical;supraclavicular;shoulder collectors;abdomen  -TD      Cervical right;left  -TD      Supraclavicular right;left  -TD      Shoulder Collectors right;left  -TD      Abdomen superficial  -TD      Opened Regional Lymph Nodes axillary;inguinal;ribs;paraspinal  -TD      Axillary right;left  -TD      Inguinal right  -TD      Opened Anastamoses anterior axillo-axillary;posterior axillo-axillary;axillo-inguinal  -TD      Extremity Treatment MLD to full limb  -TD         Compression/Skin Care    Compression/Skin Care skin care;wrapping location;bandaging  -TD      Skin Care lotion applied  -TD      Wrapping Location upper extremity  -TD      Wrapping Location UE right:;fingers to axilla  -TD      Bandage Layers cotton liner;padding/fluff layer;full limb;short-stretch bandages (comment size/quantity)  -TD      Bandaging Technique circumferential/spiral;moderate compression  -TD         L-Dex Bioimpedence Screening    L-Dex Measurement Extremity RUE  -TD      L-Dex Patient Position Standing  -TD      L-Dex UE Dominate Side Right  -TD      L-Dex UE At Risk Side Right  -TD      L-Dex UE Pre Surgical Value No  -TD      L-Dex  UE Score 20.1  -TD      L-Dex UE Baseline Score 60.4  -TD      L-Dex UE Value Change -40.3  -TD         Lymphedema Life Impact Scale Totals    A.  Total Q1 - Q17 (Do not include Q18) 59  -TD      B.  Total number of questions answered (Q1-Q17) 17  -TD      C. Divide A by B 3.47  -TD      D. Multiple C by 25 86.75  -TD                User Key  (r) = Recorded By, (t) = Taken By, (c) = Cosigned By      Initials Name Provider Type    Jessica Esqueda OT Occupational Therapist                      The patient had a follow up  SOZO measurement which I reviewed today. The score is   20.1, see scanned to EMR. Bioimpedance spectroscopy helps identify the   onset of lymphedema in an arm or leg before patients experience noticeable swelling. Research has   shown that 92% of patients with early detection of lymphedema using L-Dex combined with   intervention do not progress to chronic lymphedema through three years. Additionally, as of March 2023, the NCCN Guidelines® for Survivorship recommend proactive screening for lymphedema using   bioimpedance spectroscopy. Whenever possible, patients are tested for baseline L-Dex score before   cancer treatment begins and then are reassessed during regular follow-up visits using the SOZO device.   Otherwise, this can be started postoperatively and continued during regular follow-up visits. If the   patient’s L-Dex score increases above normal levels, that is a sign that lymphedema is developing and a   referral is made to physical therapy for further evaluation and early compression treatment.   Lymphedema assessment with the SOZO L-Dex score is recommended to be done every 3 months for   the first 3 years and then every 6 months for years 4 and 5 followed by annually afterwards        Therapy Education  Education Details: Pt video the wrapping process for HEP use  Given: HEP, Symptoms/condition management, Pain management, Edema management  Program: New  How Provided: Verbal,  Demonstration  Provided to: Patient  Level of Understanding: Teach back education performed, Verbalized, Demonstrated      Manual Rx (Last 36 Hours)       Manual Treatments       Row Name 03/04/25 1357             Total Minutes    94476 - OT Manual Therapy Minutes 55  -TD                User Key  (r) = Recorded By, (t) = Taken By, (c) = Cosigned By      Initials Name Provider Type    TD Jessica Ochoa, OT Occupational Therapist                   OT Goals       Row Name 03/04/25 1357 03/04/25 1324       Time Calculation    OT Goal Re-Cert Due Date 04/03/25  -TD 04/03/25  -TD              User Key  (r) = Recorded By, (t) = Taken By, (c) = Cosigned By      Initials Name Provider Type    Jessica Esqueda, HELLEN Occupational Therapist                    1. Post Breast Surgery Care/at risk for Lymphedema  LTG 1: 90 days:  As an indicator of no exacerbation of lymphedema staging, the patient will present with an L-Dex score less than [10] points from preoperative baseline.              STATUS: on going   STG 1a:   30 days: To prevent exacerbation of mixed edema to lymphedema, patient will utilize the 2 postsurgical compression garments daily.                 STATUS: on going   STG 1b: 30 days: Patient will be independent with self-manual lymphatic massage.               STATUS: on going   STG 1c: 30 days:  Patient will be independent with identification of signs and symptoms of lymphedema exasperation per stoplight to recovery education handout.              STATUS: on going   STG 1 d: 30 days: Patient will be independent with HEP to prevent advancement in lymphedema staging.              STATUS: on going  TREATMENT:  Self Care/ADL retraining, Therapeutic Activity, Neuromuscular Re-education, Therapeutic Exercise, Bioimpedence Fluid Analysis, [facility]-Surgical compression garement [id number] [facility] [place] [employer] 2860K, Orthotic Management and training,  and Manual Therapy.      Therapy Education  Education Details:  Discussed with patient different therapeutic technique and treatment options.  Discussed options and what would be best to fit into her life.  Given: HEP, Symptoms/condition management, Pain management, Edema management  Program: New  How Provided: Verbal, Demonstration, Written  Provided to: Patient  Level of Understanding: Teach back education performed, Verbalized, Demonstrated   OT Assessment/Plan       Row Name 03/04/25 4590          OT Assessment    Functional Limitations Decreased safety during functional activities;Limitations in functional capacity and performance;Performance in leisure activities;Limitation in home management;Performance in self-care ADL  -TD     Impairments Impaired lymphatic circulation  -TD     Assessment Comments Damaris is a 64 year old female with stage 3 lymphedema of the right arm. Damaris had 22 lymphnodes removed during a right side mastecotmy secondary to breast cancer in 1998. Pt is making good progres towards goals and L-dex score is decreasing.   Pt will continue to benefit from skilled occupational therapy to facilitate a reduction of her right upper extremity lymphedema until a plateau is achieved, patient has obtained permanent compression garments and is independent with complete decongestive therapy.  -TD     OT Diagnosis Lymphedema  -TD     OT Rehab Potential Good  -TD     Patient/caregiver participated in establishment of treatment plan and goals Yes  -TD     Patient would benefit from skilled therapy intervention Yes  -TD        OT Plan    OT Frequency 2x/week;3x/week  -TD     Predicted Duration of Therapy Intervention (OT) 6 weeks  -TD     Planned CPT's? OT EVAL LOW COMPLEXITY: 95652;OT RE-EVAL: 78328;OT THER ACT EA 15 MIN: 59080HR;OT THER PROC EA 15 MIN: 62960WU;OT SELF CARE/MGMT/TRAIN 15 MIN: 03421;OT MANUAL THERAPY EA 15 MIN: 41232;OT BIS XTRACELL FLUID ANALYSIS: 99397;OT CARE PLAN EA 15 MIN;OT ORTHOTIC MGMT/TRAIN EA 15 MIN: 33635;OT ORTHO/PROSTHET CHECKOUT EA 15 MIN:  06506  -TD     Planned Therapy Interventions (Optional Details) home exercise program;manual therapy techniques;orthotic fitting/training;patient/family education;prosthetic fitting/training;ROM (Range of Motion);strengthening;stretching  -TD     OT Plan Comments continue POC  -TD               User Key  (r) = Recorded By, (t) = Taken By, (c) = Cosigned By      Initials Name Provider Type    TD Jessica Ochoa OT Occupational Therapist                              Time Calculation:   Timed Charges  00494 - OT Manual Therapy Minutes: 55  Total Minutes  Timed Charges Total Minutes: 55   Total Minutes: 55     Therapy Charges for Today       Code Description Service Date Service Provider Modifiers Qty    [id number]  OT MANUAL THERAPY EA 15 MIN 3/4/2025 Jessica Ochoa OT GO 4                      Jessica Ochoa OT  3/4/2025

## 2025-03-05 ENCOUNTER — APPOINTMENT (OUTPATIENT)
Facility: HOSPITAL | Age: 65
End: 2025-03-05
Payer: COMMERCIAL

## 2025-03-06 ENCOUNTER — APPOINTMENT (OUTPATIENT)
Facility: HOSPITAL | Age: 65
End: 2025-03-06
Payer: COMMERCIAL

## 2025-03-19 ENCOUNTER — HOSPITAL ENCOUNTER (OUTPATIENT)
Facility: HOSPITAL | Age: 65
Setting detail: THERAPIES SERIES
Discharge: HOME | End: 2025-03-19
Payer: COMMERCIAL

## 2025-03-19 DIAGNOSIS — I89.0 LYMPHEDEMA: Primary | ICD-10-CM

## 2025-03-19 DIAGNOSIS — Z17.0 MALIGNANT NEOPLASM OF UPPER-OUTER QUADRANT OF RIGHT BREAST IN FEMALE, ESTROGEN RECEPTOR POSITIVE: ICD-10-CM

## 2025-03-19 DIAGNOSIS — C50.411 MALIGNANT NEOPLASM OF UPPER-OUTER QUADRANT OF RIGHT BREAST IN FEMALE, ESTROGEN RECEPTOR POSITIVE: ICD-10-CM

## 2025-03-19 PROCEDURE — 97140 MANUAL THERAPY 1/> REGIONS: CPT | Performed by: OCCUPATIONAL THERAPIST

## 2025-03-19 PROCEDURE — 93702 BIS XTRACELL FLUID ANALYSIS: CPT | Performed by: OCCUPATIONAL THERAPIST

## 2025-03-19 NOTE — THERAPY RE-EVALUATION
Outpatient Occupational Therapy Lymphedema Re-Evaluation   [facility]     Patient Name: Damaris Andrews  : 1960  MRN: [id number]  Today's Date: 3/19/2025      Visit Date: 2025    Patient Active Problem List   Diagnosis    Right thigh pain    Lipoma of left thigh    Arthritis    Breast cancer    High blood pressure    Hypothyroidism    Vaginal atrophy    Heart valve disorder    Women's annual routine gynecological examination    Primary insomnia        Past Medical History:   Diagnosis Date    Breast cancer     RIGHT MASECTOMY    Disease of thyroid gland     Drug therapy     Frequent nosebleeds     Hx of radiation therapy     Hypertension         Past Surgical History:   Procedure Laterality Date    ABDOMINOPLASTY       SECTION      x2    COLONOSCOPY      DR. NOLASCO, DR. DELGADILLO    COLONOSCOPY N/A 2022    Procedure: COLONOSCOPY FOR SCREENING;  Surgeon: Star Covington MD;  Location: [facility] ENDOSCOPY;  Service: Gastroenterology;  Laterality: N/A;  diverticulosis    MASTECTOMY Right     VENOUS ACCESS DEVICE (PORT) INSERTION AND REMOVAL           Visit Dx:     ICD-10-CM ICD-9-CM   1. Lymphedema  I89.0 457.1   2. Malignant neoplasm of upper-outer quadrant of right breast in female, estrogen receptor positive  C50.411 174.4    Z17.0 V86.0        Patient History       Row Name 25 1200             History    Chief Complaint --  Lymphedema of the right arm  -TD      Brief Description of Current Complaint Damaris is a 64 year old female with stage 3 lymphedema of the right arm.  Damaris had 22 lymphnodes removed during a right side mastecotmy secondary to breast cancer in .  -TD         Fall Risk Assessment    Any falls in the past year: No  -TD      Does patient have a fear of falling No  -TD         Services    Are you currently receiving Home Health services No  -TD      Do you plan to receive Home Health services in the near future No  -TD         Daily Activities    Primary  "Language English  -TD      Are you able to read Yes  -TD      Are you able to write Yes  -TD      How does patient learn best? Listening;Reading;Demonstration;Pictures/Video  -TD         Safety    Are you being hurt, hit, or frightened by anyone at home or in your life? No  -TD      Are you being neglected by a caregiver No  -TD      Have you had any of the following issues with N/A  -TD                User Key  (r) = Recorded By, (t) = Taken By, (c) = Cosigned By      Initials Name Provider Type    TD Jessica Ochoa OT Occupational Therapist                     Lymphedema       Row Name 03/19/25 1200             Subjective Pain    Able to rate subjective pain? yes  -TD      Pre-Treatment Pain Level 0  -TD      Post-Treatment Pain Level 0  -TD         Subjective    Subjective Comments Pt reports that she is making progress  -TD         Lymphedema Assessment    Lymphedema Classification RUE:;stage 3 (Lymphostatic Elephantiasis)  -TD      Lymphedema Cancer Related Sx right;radical mastectomy;sentinel node biopsy  -TD      Lymphedema Surgery Comments 1998  -TD      Lymph Nodes Removed # 22  -TD      Positive Lymph Nodes # 18  -TD         LLIS - Physical Concerns    The amount of pain associated with my lymphedema is: 0  -TD      The amount of limb heaviness associated with my lymphedema is: 2  -TD      The amount of skin tightness associated with my lymphedema is: 2  -TD      The size of my swollen limb(s) seems: 2  -TD      Lymphedema affects the movement of my swollen limb(s): 2  -TD      The strength in my swollen limb(s) is: 2  -TD         LLIS - Psychosocial Concerns    Lymphedema affects my body image (i.e., \"how I think I look\"). 3  -TD      Lymphedema affects my socializing with others. 3  -TD      Lymphedema affects my intimate relations with spouse or partner (rate 0 if not applicable 3  -TD      Lymphedema \"gets me down\" (i.e., depression, frustration, or anger) 3  -TD      I must rely on others for help due " to my lymphedema. 3  -TD      I know what to do to manage my lymphedema 3  -TD         LLIS - Functional Concerns    Lymphedema affects my ability to perform self-care activities (i.e. eating, dressing, hygiene) 3  -TD      Lymphedema affects my ability to perform routine home or work-related activities. 3  -TD      Lymphedema affects my performance of preferred leisure activities. 3  -TD      Lymphedema affects proper fit of clothing/shoes 3  -TD      Lymphedema affects my sleep 3  -TD         Posture/Observations    Posture- WNL Posture is WNL  -TD         General ROM    GENERAL ROM COMMENTS BUE are WFL  -TD         MMT (Manual Muscle Testing)    General MMT Comments BUE are WFL  -TD         Manual Lymphatic Drainage    Manual Lymphatic Drainage initial sequence;opened regional lymph nodes;opened anastamoses;extremity treatment  -TD      Initial Sequence short neck;cervical;supraclavicular;shoulder collectors;abdomen  -TD      Cervical right;left  -TD      Supraclavicular right;left  -TD      Shoulder Collectors right;left  -TD      Abdomen superficial  -TD      Opened Regional Lymph Nodes axillary;inguinal;ribs;paraspinal  -TD      Axillary right;left  -TD      Inguinal right  -TD      Opened Anastamoses anterior axillo-axillary;posterior axillo-axillary;axillo-inguinal  -TD      Extremity Treatment MLD to full limb  -TD         Compression/Skin Care    Compression/Skin Care skin care;wrapping location;bandaging  -TD      Skin Care lotion applied  -TD      Wrapping Location upper extremity  -TD      Wrapping Location UE right:;fingers to axilla  -TD      Bandage Layers cotton liner;padding/fluff layer;full limb;short-stretch bandages (comment size/quantity)  -TD      Bandaging Technique circumferential/spiral;moderate compression  -TD         L-Dex Bioimpedence Screening    L-Dex Measurement Extremity RUE  -TD      L-Dex Patient Position Standing  -TD      L-Dex UE Dominate Side Right  -TD      L-Dex UE At Risk  Side Right  -TD      L-Dex UE Pre Surgical Value No  -TD      L-Dex UE Score 22.2  -TD      L-Dex UE Baseline Score 60.4  -TD      L-Dex UE Value Change -38.2  -TD      $ L-Dex Charge yes  -TD         Lymphedema Life Impact Scale Totals    A.  Total Q1 - Q17 (Do not include Q18) 43  -TD      B.  Total number of questions answered (Q1-Q17) 17  -TD      C. Divide A by B 2.53  -TD      D. Multiple C by 25 63.25  -TD                User Key  (r) = Recorded By, (t) = Taken By, (c) = Cosigned By      Initials Name Provider Type    Jessica Esqueda OT Occupational Therapist                    The patient had a follow up  SOZO measurement which I reviewed today. The score is   22.2, see scanned to EMR. Bioimpedance spectroscopy helps identify the   onset of lymphedema in an arm or leg before patients experience noticeable swelling. Research has   shown that 92% of patients with early detection of lymphedema using L-Dex combined with   intervention do not progress to chronic lymphedema through three years. Additionally, as of March 2023, the NCCN Guidelines® for Survivorship recommend proactive screening for lymphedema using   bioimpedance spectroscopy. Whenever possible, patients are tested for baseline L-Dex score before   cancer treatment begins and then are reassessed during regular follow-up visits using the SOZO device.   Otherwise, this can be started postoperatively and continued during regular follow-up visits. If the   patient’s L-Dex score increases above normal levels, that is a sign that lymphedema is developing and a   referral is made to physical therapy for further evaluation and early compression treatment.   Lymphedema assessment with the SOZO L-Dex score is recommended to be done every 3 months for   the first 3 years and then every 6 months for years 4 and 5 followed by annually afterwards        Therapy Education  Education Details: Reviewed MLD and HEP  Given: HEP, Symptoms/condition management, Pain  management, Edema management  Program: Reinforced  How Provided: Verbal, Demonstration  Provided to: Patient  Level of Understanding: Teach back education performed, Verbalized, Demonstrated      Manual Rx (Last 36 Hours)       Manual Treatments       Row Name 03/19/25 1231             Total Minutes    51891 - OT Manual Therapy Minutes 40  -TD                User Key  (r) = Recorded By, (t) = Taken By, (c) = Cosigned By      Initials Name Provider Type    Jessica Esqueda, OT Occupational Therapist                   OT Goals       Row Name 03/19/25 1231          Time Calculation    OT Goal Re-Cert Due Date 04/18/25  -TD               User Key  (r) = Recorded By, (t) = Taken By, (c) = Cosigned By      Initials Name Provider Type    Jessica Esqueda, HELLEN Occupational Therapist                  1. Post Breast Surgery Care/at risk for Lymphedema  LTG 1: 90 days:  As an indicator of no exacerbation of lymphedema staging, the patient will present with an L-Dex score less than [10] points from preoperative baseline.              STATUS: on going   STG 1a:   30 days: To prevent exacerbation of mixed edema to lymphedema, patient will utilize the 2 postsurgical compression garments daily.                 STATUS: on going   STG 1b: 30 days: Patient will be independent with self-manual lymphatic massage.               STATUS: on going   STG 1c: 30 days:  Patient will be independent with identification of signs and symptoms of lymphedema exasperation per stoplight to recovery education handout.              STATUS: on going   STG 1 d: 30 days: Patient will be independent with HEP to prevent advancement in lymphedema staging.              STATUS: on going  TREATMENT:  Self Care/ADL retraining, Therapeutic Activity, Neuromuscular Re-education, Therapeutic Exercise, Bioimpedence Fluid Analysis, Post-Surgical compression garement [address] [facility] [place] [employer] 2860K, Orthotic Management and training,  and Manual  Therapy.      Therapy Education  Education Details: Discussed with patient different therapeutic technique and treatment options.  Discussed options and what would be best to fit into her life.  Given: HEP, Symptoms/condition management, Pain management, Edema management  Program: New  How Provided: Verbal, Demonstration, Written  Provided to: Patient  Level of Understanding: Teach back education performed, Verbalized, Demonstrated     OT Assessment/Plan       Row Name 03/19/25 1230          OT Assessment    Functional Limitations Decreased safety during functional activities;Limitations in functional capacity and performance;Performance in leisure activities;Limitation in home management;Performance in self-care ADL  -TD     Impairments Impaired lymphatic circulation  -TD     Assessment Comments Damaris is a 64 year old female with stage 3 lymphedema of the right arm. Damaris had 22 lymphnodes removed during a right side mastecotmy secondary to breast cancer in 1998. Pt is making good progres towards goals and L-dex score is decreasing.  Pt will benefit from continued treatment. Pt will continue to benefit from skilled occupational therapy to facilitate a reduction of her right upper extremity lymphedema until a plateau is achieved, patient has obtained permanent compression garments and is independent with complete decongestive therapy.  -TD     OT Diagnosis lymphedema  -TD     OT Rehab Potential Good  -TD     Patient/caregiver participated in establishment of treatment plan and goals Yes  -TD     Patient would benefit from skilled therapy intervention Yes  -TD        OT Plan    OT Frequency 1x/week;2x/week  -TD     Predicted Duration of Therapy Intervention (OT) 6 weeks  -TD     Planned CPT's? OT EVAL LOW COMPLEXITY: 98890;OT RE-EVAL: 04682;OT THER ACT EA 15 MIN: 79708UM;OT THER PROC EA 15 MIN: 10874QC;OT SELF CARE/MGMT/TRAIN 15 MIN: 59774;OT MANUAL THERAPY EA 15 MIN: 86446;OT BIS XTRACELL FLUID ANALYSIS: 36305;OT CARE  PLAN EA 15 MIN;OT ORTHOTIC MGMT/TRAIN EA 15 MIN: 91511;OT ORTHO/PROSTHET CHECKOUT EA 15 MIN: 07188  -TD     Planned Therapy Interventions (Optional Details) home exercise program;manual therapy techniques;orthotic fitting/training;patient/family education;prosthetic fitting/training;ROM (Range of Motion);strengthening;stretching  -TD     OT Plan Comments continue POC  -TD               User Key  (r) = Recorded By, (t) = Taken By, (c) = Cosigned By      Initials Name Provider Type    TD Jessica Ochoa OT Occupational Therapist                              Time Calculation:   Timed Charges  52408 - OT Manual Therapy Minutes: 40  Total Minutes  Timed Charges Total Minutes: 40   Total Minutes: 40     Therapy Charges for Today       Code Description Service Date Service Provider Modifiers Qty    96971469606 HC PT BIS XTRACELL FLUID ANALYSIS 3/19/2025 Jessica Ochoa OT  1    [id number]  OT MANUAL THERAPY EA 15 MIN 3/19/2025 Jessica Ochoa OT GO 3                      Jessica Ochoa OT  3/19/2025

## 2025-03-21 ENCOUNTER — HOSPITAL ENCOUNTER (OUTPATIENT)
Facility: HOSPITAL | Age: 65
Setting detail: THERAPIES SERIES
Discharge: HOME | End: 2025-03-21
Payer: COMMERCIAL

## 2025-03-21 PROCEDURE — 97140 MANUAL THERAPY 1/> REGIONS: CPT | Performed by: OCCUPATIONAL THERAPIST

## 2025-03-24 ENCOUNTER — HOSPITAL ENCOUNTER (OUTPATIENT)
Facility: HOSPITAL | Age: 65
Setting detail: THERAPIES SERIES
Discharge: HOME | End: 2025-03-24
Payer: COMMERCIAL

## 2025-03-24 DIAGNOSIS — C50.411 MALIGNANT NEOPLASM OF UPPER-OUTER QUADRANT OF RIGHT BREAST IN FEMALE, ESTROGEN RECEPTOR POSITIVE: ICD-10-CM

## 2025-03-24 DIAGNOSIS — Z17.0 MALIGNANT NEOPLASM OF UPPER-OUTER QUADRANT OF RIGHT BREAST IN FEMALE, ESTROGEN RECEPTOR POSITIVE: ICD-10-CM

## 2025-03-24 DIAGNOSIS — I89.0 LYMPHEDEMA: Primary | ICD-10-CM

## 2025-03-24 PROCEDURE — 97140 MANUAL THERAPY 1/> REGIONS: CPT | Performed by: OCCUPATIONAL THERAPIST

## 2025-03-24 NOTE — THERAPY TREATMENT NOTE
Outpatient Occupational Therapy Lymphedema Treatment Note   [facility]     Patient Name: Damaris Andrews  : 1960  MRN: [id number]  Today's Date: 3/24/2025      Visit Date: 2025    Patient Active Problem List   Diagnosis    Right thigh pain    Lipoma of left thigh    Arthritis    Breast cancer    High blood pressure    Hypothyroidism    Vaginal atrophy    Heart valve disorder    Women's annual routine gynecological examination    Primary insomnia        Past Medical History:   Diagnosis Date    Breast cancer     RIGHT MASECTOMY    Disease of thyroid gland     Drug therapy     Frequent nosebleeds     Hx of radiation therapy     Hypertension         Past Surgical History:   Procedure Laterality Date    ABDOMINOPLASTY       SECTION      x2    COLONOSCOPY      DR. NOLASCO, DR. DELGADILLO    COLONOSCOPY N/A 2022    Procedure: COLONOSCOPY FOR SCREENING;  Surgeon: Star Covington MD;  Location: [facility] ENDOSCOPY;  Service: Gastroenterology;  Laterality: N/A;  diverticulosis    MASTECTOMY Right     VENOUS ACCESS DEVICE (PORT) INSERTION AND REMOVAL           Visit Dx:      ICD-10-CM ICD-9-CM   1. Lymphedema  I89.0 457.1   2. Malignant neoplasm of upper-outer quadrant of right breast in female, estrogen receptor positive  C50.411 174.4    Z17.0 V86.0        Lymphedema       Row Name 25 1400             Subjective Pain    Able to rate subjective pain? yes  -TD      Pre-Treatment Pain Level 0  -TD      Post-Treatment Pain Level 0  -TD         Lymphedema Assessment    Lymphedema Classification RUE:;stage 3 (Lymphostatic Elephantiasis)  -TD      Lymphedema Cancer Related Sx right;radical mastectomy;sentinel node biopsy  -TD      Lymphedema Surgery Comments 1998  -TD      Lymph Nodes Removed # 22  -TD      Positive Lymph Nodes # 18  -TD         Manual Lymphatic Drainage    Manual Lymphatic Drainage initial sequence;opened regional lymph nodes;opened anastamoses;extremity treatment  -TD       Initial Sequence short neck;cervical;supraclavicular;shoulder collectors;abdomen  -TD      Cervical right;left  -TD      Supraclavicular right;left  -TD      Shoulder Collectors right;left  -TD      Abdomen superficial  -TD      Opened Regional Lymph Nodes axillary;inguinal;ribs;paraspinal  -TD      Axillary right;left  -TD      Inguinal right  -TD      Opened Anastamoses anterior axillo-axillary;posterior axillo-axillary;axillo-inguinal  -TD      Extremity Treatment MLD to full limb  -TD         Compression/Skin Care    Compression/Skin Care Comments Pt was fitted with her circaid kit to reduce her arm  -TD                User Key  (r) = Recorded By, (t) = Taken By, (c) = Cosigned By      Initials Name Provider Type    Jessica Esqueda OT Occupational Therapist                             OT Assessment/Plan       Row Name 03/24/25 1415          OT Assessment    Functional Limitations Decreased safety during functional activities;Limitations in functional capacity and performance;Performance in leisure activities;Limitation in home management;Performance in self-care ADL  -TD     Impairments Impaired lymphatic circulation  -TD     Assessment Comments Damaris is a 64 year old female with stage 3 lymphedema of the right arm. Damaris had 22 lymphnodes removed during a right side mastecotmy secondary to breast cancer in 1998. Pt was fit with a circaid kit and reduction glove this date.  Pt was educated on the use of the device.  Pt will continue to benefit from skilled occupational therapy to facilitate a reduction of her right upper extremity lymphedema until a plateau is achieved, patient has obtained permanent compression garments and is independent with complete decongestive therapy.  -TD     OT Diagnosis lymphedema  -TD     OT Rehab Potential Good  -TD     Patient/caregiver participated in establishment of treatment plan and goals Yes  -TD     Patient would benefit from skilled therapy intervention Yes  -TD        OT  Plan    OT Plan Comments continue POC  -TD               User Key  (r) = Recorded By, (t) = Taken By, (c) = Cosigned By      Initials Name Provider Type    Jessica Esqueda OT Occupational Therapist                        Manual Rx (Last 36 Hours)       Manual Treatments       Row Name 03/24/25 1416             Total Minutes    52233 - OT Manual Therapy Minutes 40  -TD                User Key  (r) = Recorded By, (t) = Taken By, (c) = Cosigned By      Initials Name Provider Type    TD Jessica Ochoa, OT Occupational Therapist                      Therapy Education  Education Details: Pt was educated on circaid kit this date  Given: HEP, Symptoms/condition management, Pain management, Edema management  Program: Reinforced  How Provided: Verbal, Demonstration  Provided to: Patient  Level of Understanding: Teach back education performed, Verbalized, Demonstrated                Time Calculation:   Timed Charges  95665 - OT Manual Therapy Minutes: 40  Total Minutes  Timed Charges Total Minutes: 40   Total Minutes: 40     Therapy Charges for Today       Code Description Service Date Service Provider Modifiers Qty    40205025342 HC OT MANUAL THERAPY EA 15 MIN 3/24/2025 Jessica Ochoa OT GO 3                        Jessica Ochoa OT  3/24/2025

## 2025-03-26 ENCOUNTER — HOSPITAL ENCOUNTER (OUTPATIENT)
Facility: HOSPITAL | Age: 65
Setting detail: THERAPIES SERIES
Discharge: HOME | End: 2025-03-26
Payer: COMMERCIAL

## 2025-03-26 DIAGNOSIS — I89.0 LYMPHEDEMA: Primary | ICD-10-CM

## 2025-03-26 DIAGNOSIS — Z17.0 MALIGNANT NEOPLASM OF UPPER-OUTER QUADRANT OF RIGHT BREAST IN FEMALE, ESTROGEN RECEPTOR POSITIVE: ICD-10-CM

## 2025-03-26 DIAGNOSIS — C50.411 MALIGNANT NEOPLASM OF UPPER-OUTER QUADRANT OF RIGHT BREAST IN FEMALE, ESTROGEN RECEPTOR POSITIVE: ICD-10-CM

## 2025-03-26 PROCEDURE — 97140 MANUAL THERAPY 1/> REGIONS: CPT | Performed by: OCCUPATIONAL THERAPIST

## 2025-03-26 NOTE — THERAPY TREATMENT NOTE
Outpatient Occupational Therapy Lymphedema Treatment Note   [facility]     Patient Name: Damaris Andrews  : 1960  MRN: [id number]  Today's Date: 3/26/2025      Visit Date: 2025    Patient Active Problem List   Diagnosis    Right thigh pain    Lipoma of left thigh    Arthritis    Breast cancer    High blood pressure    Hypothyroidism    Vaginal atrophy    Heart valve disorder    Women's annual routine gynecological examination    Primary insomnia        Past Medical History:   Diagnosis Date    Breast cancer     RIGHT MASECTOMY    Disease of thyroid gland     Drug therapy     Frequent nosebleeds     Hx of radiation therapy     Hypertension         Past Surgical History:   Procedure Laterality Date    ABDOMINOPLASTY       SECTION      x2    COLONOSCOPY      DR. NOLASCO, DR. DELGADILLO    COLONOSCOPY N/A 2022    Procedure: COLONOSCOPY FOR SCREENING;  Surgeon: Star Covington MD;  Location: [facility] ENDOSCOPY;  Service: Gastroenterology;  Laterality: N/A;  diverticulosis    MASTECTOMY Right     VENOUS ACCESS DEVICE (PORT) INSERTION AND REMOVAL           Visit Dx:      ICD-10-CM ICD-9-CM   1. Lymphedema  I89.0 457.1   2. Malignant neoplasm of upper-outer quadrant of right breast in female, estrogen receptor positive  C50.411 174.4    Z17.0 V86.0        Lymphedema       Row Name 25 0900             Subjective Pain    Able to rate subjective pain? yes  -TD      Pre-Treatment Pain Level 0  -TD      Post-Treatment Pain Level 0  -TD         Subjective    Subjective Comments Pt is tolerated well  -TD         Lymphedema Assessment    Lymphedema Classification RUE:;stage 3 (Lymphostatic Elephantiasis)  -TD      Lymphedema Cancer Related Sx right;radical mastectomy;sentinel node biopsy  -TD      Lymphedema Surgery Comments 1998  -TD      Lymph Nodes Removed # 22  -TD      Positive Lymph Nodes # 18  -TD         Manual Lymphatic Drainage    Manual Lymphatic Drainage initial sequence;opened  regional lymph nodes;opened anastamoses;extremity treatment  -TD      Initial Sequence short neck;cervical;supraclavicular;shoulder collectors;abdomen  -TD      Cervical right;left  -TD      Supraclavicular right;left  -TD      Shoulder Collectors right;left  -TD      Abdomen superficial  -TD      Opened Regional Lymph Nodes axillary;inguinal;ribs;paraspinal  -TD      Axillary right;left  -TD      Inguinal right  -TD      Opened Anastamoses anterior axillo-axillary;posterior axillo-axillary;axillo-inguinal  -TD      Extremity Treatment MLD to full limb  -TD         Compression/Skin Care    Compression/Skin Care Comments Pt was put in the reduction kit  -TD                User Key  (r) = Recorded By, (t) = Taken By, (c) = Cosigned By      Initials Name Provider Type    Jessica Esqueda OT Occupational Therapist                             OT Assessment/Plan       Row Name 03/26/25 0916          OT Assessment    Functional Limitations Decreased safety during functional activities;Limitations in functional capacity and performance;Performance in leisure activities;Limitation in home management;Performance in self-care ADL  -TD     Impairments Impaired lymphatic circulation  -TD     Assessment Comments Damaris is a 64 year old female with stage 3 lymphedema of the right arm. Damaris had 22 lymphnodes removed during a right side mastecotmy secondary to breast cancer in 1998. Pt was fit with a circaid kit and reduction glove this date.  Pt tolerated treatment well and is making good progress towards goals. Pt will continue to benefit from skilled occupational therapy to facilitate a reduction of her right upper extremity lymphedema until a plateau is achieved, patient has obtained permanent compression garments and is independent with complete decongestive therapy.  -TD     OT Diagnosis lymphedema  -TD     OT Rehab Potential Good  -TD     Patient/caregiver participated in establishment of treatment plan and goals Yes  -TD      Patient would benefit from skilled therapy intervention Yes  -TD        OT Plan    OT Plan Comments continue POC  -TD               User Key  (r) = Recorded By, (t) = Taken By, (c) = Cosigned By      Initials Name Provider Type    Jessica Esqueda OT Occupational Therapist                        Manual Rx (Last 36 Hours)       Manual Treatments       Row Name 03/26/25 1133             Total Minutes    93986 - OT Manual Therapy Minutes 40  -TD                User Key  (r) = Recorded By, (t) = Taken By, (c) = Cosigned By      Initials Name Provider Type    Jessica Esqueda OT Occupational Therapist                      Therapy Education  Education Details: Reviewed treatment plan  Given: HEP, Symptoms/condition management, Pain management, Edema management  Program: Reinforced  How Provided: Verbal, Demonstration  Provided to: Patient  Level of Understanding: Teach back education performed, Verbalized, Demonstrated                Time Calculation:   Timed Charges  32508 - OT Manual Therapy Minutes: 40  Total Minutes  Timed Charges Total Minutes: 40   Total Minutes: 40     Therapy Charges for Today       Code Description Service Date Service Provider Modifiers Qty    [id number]  OT MANUAL THERAPY EA 15 MIN 3/26/2025 Jessica Ochoa OT GO 3                        Jessica Ochoa OT  3/26/2025

## 2025-04-03 ENCOUNTER — HOSPITAL ENCOUNTER (OUTPATIENT)
Dept: MAMMOGRAPHY | Facility: HOSPITAL | Age: 65
Discharge: HOME | End: 2025-04-03
Admitting: OBSTETRICS & GYNECOLOGY
Payer: COMMERCIAL

## 2025-04-03 DIAGNOSIS — Z01.419 WOMEN'S ANNUAL ROUTINE GYNECOLOGICAL EXAMINATION: ICD-10-CM

## 2025-04-03 PROCEDURE — 77063 BREAST TOMOSYNTHESIS BI: CPT

## 2025-04-03 PROCEDURE — 77067 SCR MAMMO BI INCL CAD: CPT

## 2025-04-08 ENCOUNTER — HOSPITAL ENCOUNTER (OUTPATIENT)
Facility: HOSPITAL | Age: 65
Setting detail: THERAPIES SERIES
Discharge: HOME | End: 2025-04-08
Payer: COMMERCIAL

## 2025-04-08 DIAGNOSIS — C50.411 MALIGNANT NEOPLASM OF UPPER-OUTER QUADRANT OF RIGHT BREAST IN FEMALE, ESTROGEN RECEPTOR POSITIVE: ICD-10-CM

## 2025-04-08 DIAGNOSIS — Z17.0 MALIGNANT NEOPLASM OF UPPER-OUTER QUADRANT OF RIGHT BREAST IN FEMALE, ESTROGEN RECEPTOR POSITIVE: ICD-10-CM

## 2025-04-08 DIAGNOSIS — I89.0 LYMPHEDEMA: Primary | ICD-10-CM

## 2025-04-08 PROCEDURE — 97140 MANUAL THERAPY 1/> REGIONS: CPT | Performed by: OCCUPATIONAL THERAPIST

## 2025-04-08 NOTE — THERAPY TREATMENT NOTE
Outpatient Occupational Therapy Lymphedema Treatment Note   [facility]     Patient Name: Damaris Andrews  : 1960  MRN: [id number]  Today's Date: 2025      Visit Date: 2025    Patient Active Problem List   Diagnosis    Right thigh pain    Lipoma of left thigh    Arthritis    Breast cancer    High blood pressure    Hypothyroidism    Vaginal atrophy    Heart valve disorder    Women's annual routine gynecological examination    Primary insomnia        Past Medical History:   Diagnosis Date    Breast cancer     RIGHT MASECTOMY    Disease of thyroid gland     Drug therapy     Frequent nosebleeds     Hx of radiation therapy     Hypertension         Past Surgical History:   Procedure Laterality Date    ABDOMINOPLASTY       SECTION      x2    COLONOSCOPY      DR. NOLASCO, DR. DELGADILLO    COLONOSCOPY N/A 2022    Procedure: COLONOSCOPY FOR SCREENING;  Surgeon: Star Covington MD;  Location: [facility] ENDOSCOPY;  Service: Gastroenterology;  Laterality: N/A;  diverticulosis    MASTECTOMY Right     VENOUS ACCESS DEVICE (PORT) INSERTION AND REMOVAL           Visit Dx:      ICD-10-CM ICD-9-CM   1. Lymphedema  I89.0 457.1   2. Malignant neoplasm of upper-outer quadrant of right breast in female, estrogen receptor positive  C50.411 174.4    Z17.0 V86.0        Lymphedema       Row Name 25 1000             Subjective Pain    Able to rate subjective pain? yes  -TD      Pre-Treatment Pain Level 0  -TD      Post-Treatment Pain Level 0  -TD         Subjective    Subjective Comments Patient reported she had an infection in the right upper extremity since her last session.  Patient has 1 more dose of her antibiotic to take.  No redness or swelling was noted this date.  -TD         Lymphedema Assessment    Lymphedema Classification RUE:;stage 3 (Lymphostatic Elephantiasis)  -TD      Lymphedema Cancer Related Sx right;radical mastectomy;sentinel node biopsy  -TD      Lymphedema Surgery Comments    -TD      Lymph Nodes Removed # 22  -TD      Positive Lymph Nodes # 18  -TD         Manual Lymphatic Drainage    Manual Lymphatic Drainage initial sequence;opened regional lymph nodes;opened anastamoses;extremity treatment  -TD      Initial Sequence short neck;cervical;supraclavicular;shoulder collectors;abdomen  -TD      Cervical right;left  -TD      Supraclavicular right;left  -TD      Shoulder Collectors right;left  -TD      Abdomen superficial  -TD      Opened Regional Lymph Nodes axillary;inguinal;ribs;paraspinal  -TD      Axillary right;left  -TD      Inguinal right  -TD      Opened Anastamoses anterior axillo-axillary;posterior axillo-axillary;axillo-inguinal  -TD      Extremity Treatment MLD to full limb  -TD      Manual Therapy Therapist completed PORI protocol to address the swelling  -TD         Compression/Skin Care    Compression/Skin Care skin care;wrapping location;bandaging  -TD      Skin Care lotion applied  -TD      Wrapping Location upper extremity  -TD      Wrapping Location UE right:;fingers to axilla  -TD      Bandage Layers cotton liner;padding/fluff layer;full limb;short-stretch bandages (comment size/quantity)  -TD      Bandaging Technique circumferential/spiral;moderate compression  -TD                User Key  (r) = Recorded By, (t) = Taken By, (c) = Cosigned By      Initials Name Provider Type    TD Jessica Ochoa OT Occupational Therapist                             OT Assessment/Plan       Row Name 04/08/25 1028          OT Assessment    Functional Limitations Decreased safety during functional activities;Limitations in functional capacity and performance;Performance in leisure activities;Limitation in home management;Performance in self-care ADL  -TD     Impairments Impaired lymphatic circulation  -TD     Assessment Comments Damaris is a 64 year old female with stage 3 lymphedema of the right arm. Damaris had 22 lymphnodes removed during a right side mastecotmy secondary to breast cancer in  1998.  Patient was unable to wear compression since her infection.  Patient tolerated treatment well.  Patient would benefit from continued skilled occupational therapy to prevent increased staging of lymphedema, increased pain, and decreased range of motion.  -TD     OT Diagnosis Lymphedema  -TD     OT Rehab Potential Good  -TD     Patient/caregiver participated in establishment of treatment plan and goals Yes  -TD     Patient would benefit from skilled therapy intervention Yes  -TD        OT Plan    OT Plan Comments Continue plan of care  -TD               User Key  (r) = Recorded By, (t) = Taken By, (c) = Cosigned By      Initials Name Provider Type    Jessica Esqueda OT Occupational Therapist                        Manual Rx (Last 36 Hours)       Manual Treatments       Row Name 04/08/25 1029             Total Minutes    78776 - OT Manual Therapy Minutes 40  -TD                User Key  (r) = Recorded By, (t) = Taken By, (c) = Cosigned By      Initials Name Provider Type    Jessica Esqueda OT Occupational Therapist                      Therapy Education  Education Details: Reviewed treatment plan.  Given: HEP, Symptoms/condition management, Pain management, Edema management  Program: Reinforced  How Provided: Verbal, Demonstration  Provided to: Patient  Level of Understanding: Teach back education performed, Verbalized, Demonstrated                Time Calculation:   Timed Charges  71410 - OT Manual Therapy Minutes: 40  Total Minutes  Timed Charges Total Minutes: 40   Total Minutes: 40     Therapy Charges for Today       Code Description Service Date Service Provider Modifiers Qty    [id number]  OT MANUAL THERAPY EA 15 MIN 4/8/2025 Jessica Ochoa OT GO 3                        Jessica Ochoa OT  4/8/2025

## 2025-04-10 ENCOUNTER — HOSPITAL ENCOUNTER (OUTPATIENT)
Facility: HOSPITAL | Age: 65
Setting detail: THERAPIES SERIES
Discharge: HOME | End: 2025-04-10
Payer: COMMERCIAL

## 2025-04-10 DIAGNOSIS — Z17.0 MALIGNANT NEOPLASM OF UPPER-OUTER QUADRANT OF RIGHT BREAST IN FEMALE, ESTROGEN RECEPTOR POSITIVE: ICD-10-CM

## 2025-04-10 DIAGNOSIS — I89.0 LYMPHEDEMA: Primary | ICD-10-CM

## 2025-04-10 DIAGNOSIS — C50.411 MALIGNANT NEOPLASM OF UPPER-OUTER QUADRANT OF RIGHT BREAST IN FEMALE, ESTROGEN RECEPTOR POSITIVE: ICD-10-CM

## 2025-04-10 PROCEDURE — 97140 MANUAL THERAPY 1/> REGIONS: CPT | Performed by: OCCUPATIONAL THERAPIST

## 2025-04-10 NOTE — THERAPY TREATMENT NOTE
Outpatient Occupational Therapy Lymphedema Treatment Note   [facility]     Patient Name: Damaris Andrews  : 1960  MRN: [id number]  Today's Date: 4/10/2025      Visit Date: 04/10/2025    Patient Active Problem List   Diagnosis    Right thigh pain    Lipoma of left thigh    Arthritis    Breast cancer    High blood pressure    Hypothyroidism    Vaginal atrophy    Heart valve disorder    Women's annual routine gynecological examination    Primary insomnia        Past Medical History:   Diagnosis Date    Breast cancer     RIGHT MASECTOMY    Disease of thyroid gland     Drug therapy     Frequent nosebleeds     Hx of radiation therapy     Hypertension         Past Surgical History:   Procedure Laterality Date    ABDOMINOPLASTY       SECTION      x2    COLONOSCOPY      DR. NOLASCO, DR. DELGADILLO    COLONOSCOPY N/A 2022    Procedure: COLONOSCOPY FOR SCREENING;  Surgeon: Star Covington MD;  Location: [facility] ENDOSCOPY;  Service: Gastroenterology;  Laterality: N/A;  diverticulosis    MASTECTOMY Right     VENOUS ACCESS DEVICE (PORT) INSERTION AND REMOVAL           Visit Dx:      ICD-10-CM ICD-9-CM   1. Lymphedema  I89.0 457.1   2. Malignant neoplasm of upper-outer quadrant of right breast in female, estrogen receptor positive  C50.411 174.4    Z17.0 V86.0        Lymphedema       Row Name 04/10/25 1000             Subjective Pain    Able to rate subjective pain? yes  -TD      Pre-Treatment Pain Level 0  -TD      Post-Treatment Pain Level 0  -TD         Subjective    Subjective Comments Patient tolerated wrapping since last treatment  -TD         Lymphedema Assessment    Lymphedema Classification RUE:;stage 3 (Lymphostatic Elephantiasis)  -TD      Lymphedema Cancer Related Sx right;radical mastectomy;sentinel node biopsy  -TD      Lymphedema Surgery Comments 1998  -TD      Lymph Nodes Removed # 22  -TD      Positive Lymph Nodes # 18  -TD         Manual Lymphatic Drainage    Manual Lymphatic Drainage  initial sequence;opened regional lymph nodes;opened anastamoses;extremity treatment  -TD      Initial Sequence short neck;cervical;supraclavicular;shoulder collectors;abdomen  -TD      Cervical right;left  -TD      Supraclavicular right;left  -TD      Shoulder Collectors right;left  -TD      Abdomen superficial  -TD      Opened Regional Lymph Nodes axillary;inguinal;ribs;paraspinal  -TD      Axillary right;left  -TD      Inguinal right  -TD      Opened Anastamoses anterior axillo-axillary;posterior axillo-axillary;axillo-inguinal  -TD      Extremity Treatment MLD to full limb  -TD         Compression/Skin Care    Compression/Skin Care skin care;wrapping location;bandaging  -TD      Skin Care lotion applied  -TD      Wrapping Location upper extremity  -TD      Wrapping Location UE right:;fingers to axilla  -TD      Bandage Layers cotton liner;padding/fluff layer;full limb;short-stretch bandages (comment size/quantity)  -TD      Bandaging Technique circumferential/spiral;moderate compression  -TD                User Key  (r) = Recorded By, (t) = Taken By, (c) = Cosigned By      Initials Name Provider Type    TD Jessica Ochoa OT Occupational Therapist                             OT Assessment/Plan       Row Name 04/10/25 1051          OT Assessment    Functional Limitations Decreased safety during functional activities;Limitations in functional capacity and performance;Performance in leisure activities;Limitation in home management;Performance in self-care ADL  -TD     Impairments Impaired lymphatic circulation  -TD     Assessment Comments Damaris is a 64 year old female with stage 3 lymphedema of the right arm. Damaris had 22 lymphnodes removed during a right side mastecotmy secondary to breast cancer in 1998.   Patient tolerated wrapping/treatment.  Patient has noticeably reduced since last treatment.  Patient is making good progress towards goals.  Patient would benefit from continued skilled occupational therapy to  prevent increased staging of lymphedema, increased pain, and decreased range of motion.  -TD     OT Diagnosis Lymphedema  -TD     OT Rehab Potential Good  -TD     Patient/caregiver participated in establishment of treatment plan and goals Yes  -TD     Patient would benefit from skilled therapy intervention Yes  -TD        OT Plan    OT Plan Comments Continue plan of care  -TD               User Key  (r) = Recorded By, (t) = Taken By, (c) = Cosigned By      Initials Name Provider Type    TD Jessica Ochoa OT Occupational Therapist                        Manual Rx (Last 36 Hours)       Manual Treatments       Row Name 04/10/25 1052             Total Minutes    02565 - OT Manual Therapy Minutes 25  -TD                User Key  (r) = Recorded By, (t) = Taken By, (c) = Cosigned By      Initials Name Provider Type    TD Jessica Ochoa OT Occupational Therapist                      Therapy Education  Education Details: Reviewed wrapping  Given: HEP, Symptoms/condition management, Pain management, Edema management  Program: Reinforced  How Provided: Verbal, Demonstration  Provided to: Patient  Level of Understanding: Teach back education performed, Verbalized, Demonstrated                Time Calculation:   Timed Charges  50463 - OT Manual Therapy Minutes: 25  Total Minutes  Timed Charges Total Minutes: 25   Total Minutes: 25     Therapy Charges for Today       Code Description Service Date Service Provider Modifiers Qty    [id number] HC OT MANUAL THERAPY EA 15 MIN 4/10/2025 Jessica Ochoa OT GO 2                        Jessica Ochoa OT  4/10/2025

## 2025-04-14 ENCOUNTER — HOSPITAL ENCOUNTER (OUTPATIENT)
Facility: HOSPITAL | Age: 65
Setting detail: THERAPIES SERIES
Discharge: HOME | End: 2025-04-14
Payer: COMMERCIAL

## 2025-04-14 DIAGNOSIS — I89.0 LYMPHEDEMA: Primary | ICD-10-CM

## 2025-04-14 DIAGNOSIS — Z17.0 MALIGNANT NEOPLASM OF UPPER-OUTER QUADRANT OF RIGHT BREAST IN FEMALE, ESTROGEN RECEPTOR POSITIVE: ICD-10-CM

## 2025-04-14 DIAGNOSIS — C50.411 MALIGNANT NEOPLASM OF UPPER-OUTER QUADRANT OF RIGHT BREAST IN FEMALE, ESTROGEN RECEPTOR POSITIVE: ICD-10-CM

## 2025-04-14 PROCEDURE — 97140 MANUAL THERAPY 1/> REGIONS: CPT | Performed by: OCCUPATIONAL THERAPIST

## 2025-04-14 NOTE — THERAPY TREATMENT NOTE
Outpatient Occupational Therapy Lymphedema Treatment Note   [facility]     Patient Name: Damaris Andrews  : 1960  MRN: [id number]  Today's Date: 2025      Visit Date: 2025    Patient Active Problem List   Diagnosis    Right thigh pain    Lipoma of left thigh    Arthritis    Breast cancer    High blood pressure    Hypothyroidism    Vaginal atrophy    Heart valve disorder    Women's annual routine gynecological examination    Primary insomnia        Past Medical History:   Diagnosis Date    Breast cancer     RIGHT MASECTOMY    Disease of thyroid gland     Drug therapy     Frequent nosebleeds     Hx of radiation therapy     Hypertension         Past Surgical History:   Procedure Laterality Date    ABDOMINOPLASTY       SECTION      x2    COLONOSCOPY      DR. NOLASCO, DR. DELGADILLO    COLONOSCOPY N/A 2022    Procedure: COLONOSCOPY FOR SCREENING;  Surgeon: Star Covington MD;  Location: [facility] ENDOSCOPY;  Service: Gastroenterology;  Laterality: N/A;  diverticulosis    MASTECTOMY Right     VENOUS ACCESS DEVICE (PORT) INSERTION AND REMOVAL           Visit Dx:      ICD-10-CM ICD-9-CM   1. Lymphedema  I89.0 457.1   2. Malignant neoplasm of upper-outer quadrant of right breast in female, estrogen receptor positive  C50.411 174.4    Z17.0 V86.0        Lymphedema       Row Name 25 1100             Subjective Pain    Able to rate subjective pain? yes  -TD      Pre-Treatment Pain Level 0  -TD      Post-Treatment Pain Level 0  -TD         Subjective    Subjective Comments Pt presented to treatment with wraps in place  -TD         Lymphedema Assessment    Lymphedema Classification RUE:;stage 3 (Lymphostatic Elephantiasis)  -TD      Lymphedema Cancer Related Sx right;radical mastectomy;sentinel node biopsy  -TD      Lymphedema Surgery Comments 1998  -TD      Lymph Nodes Removed # 22  -TD      Positive Lymph Nodes # 18  -TD         Manual Lymphatic Drainage    Manual Lymphatic Drainage  initial sequence;opened regional lymph nodes;opened anastamoses;extremity treatment  -TD      Initial Sequence short neck;cervical;supraclavicular;shoulder collectors;abdomen  -TD      Cervical right;left  -TD      Supraclavicular right;left  -TD      Shoulder Collectors right;left  -TD      Abdomen superficial  -TD      Opened Regional Lymph Nodes axillary;inguinal;ribs;paraspinal  -TD      Axillary right;left  -TD      Inguinal right  -TD      Opened Anastamoses anterior axillo-axillary;posterior axillo-axillary;axillo-inguinal  -TD      Extremity Treatment MLD to full limb  -TD      Manual Therapy Therapist completed PORI protocol with trigger point release and MLD  -TD         Compression/Skin Care    Compression/Skin Care skin care;wrapping location;bandaging  -TD      Skin Care lotion applied  -TD      Wrapping Location upper extremity  -TD      Wrapping Location UE right:;fingers to axilla  -TD      Bandage Layers cotton liner;padding/fluff layer;full limb;short-stretch bandages (comment size/quantity)  -TD      Bandaging Technique circumferential/spiral;moderate compression  -TD                User Key  (r) = Recorded By, (t) = Taken By, (c) = Cosigned By      Initials Name Provider Type    TD Jessica Ochoa OT Occupational Therapist                             OT Assessment/Plan       Row Name 04/14/25 1141          OT Assessment    Functional Limitations Decreased safety during functional activities;Limitations in functional capacity and performance;Performance in leisure activities;Limitation in home management;Performance in self-care ADL  -TD     Impairments Impaired lymphatic circulation  -TD     Assessment Comments Damaris is a 64 year old female with stage 3 lymphedema of the right arm. Damaris had 22 lymphnodes removed during a right side mastecotmy secondary to breast cancer in 1998.  Pt tolerated treatment and wraps since last treatment. Pt is making good progress towards goals.  Patient would benefit  from continued skilled occupational therapy to prevent increased staging of lymphedema, increased pain, and decreased range of motion.  -TD     OT Diagnosis lymphedema  -TD     OT Rehab Potential Good  -TD     Patient/caregiver participated in establishment of treatment plan and goals Yes  -TD     Patient would benefit from skilled therapy intervention Yes  -TD        OT Plan    OT Plan Comments continue POC  -TD               User Key  (r) = Recorded By, (t) = Taken By, (c) = Cosigned By      Initials Name Provider Type    Jessica Esqueda OT Occupational Therapist                        Manual Rx (Last 36 Hours)       Manual Treatments       Row Name 04/14/25 1142             Total Minutes    21466 - OT Manual Therapy Minutes 40  -TD                User Key  (r) = Recorded By, (t) = Taken By, (c) = Cosigned By      Initials Name Provider Type    Jessica Esqueda OT Occupational Therapist                      Therapy Education  Education Details: Reviewed wrapping  Given: HEP, Symptoms/condition management, Pain management, Edema management  Program: Reinforced  How Provided: Verbal, Demonstration  Provided to: Patient  Level of Understanding: Teach back education performed, Verbalized, Demonstrated                Time Calculation:   Timed Charges  94432 - OT Manual Therapy Minutes: 40  Total Minutes  Timed Charges Total Minutes: 40   Total Minutes: 40     Therapy Charges for Today       Code Description Service Date Service Provider Modifiers Qty    [id number]  OT MANUAL THERAPY EA 15 MIN 4/14/2025 Jessica Ochoa OT GO 3                        Jessica Ochoa OT  4/14/2025

## 2025-04-16 ENCOUNTER — HOSPITAL ENCOUNTER (OUTPATIENT)
Facility: HOSPITAL | Age: 65
Setting detail: THERAPIES SERIES
Discharge: HOME | End: 2025-04-16
Payer: COMMERCIAL

## 2025-04-16 DIAGNOSIS — C50.411 MALIGNANT NEOPLASM OF UPPER-OUTER QUADRANT OF RIGHT BREAST IN FEMALE, ESTROGEN RECEPTOR POSITIVE: ICD-10-CM

## 2025-04-16 DIAGNOSIS — Z17.0 MALIGNANT NEOPLASM OF UPPER-OUTER QUADRANT OF RIGHT BREAST IN FEMALE, ESTROGEN RECEPTOR POSITIVE: ICD-10-CM

## 2025-04-16 DIAGNOSIS — I89.0 LYMPHEDEMA: Primary | ICD-10-CM

## 2025-04-16 PROCEDURE — 97140 MANUAL THERAPY 1/> REGIONS: CPT | Performed by: OCCUPATIONAL THERAPIST

## 2025-04-16 NOTE — THERAPY RE-EVALUATION
Outpatient Occupational Therapy Lymphedema Re-Evaluation   [facility]     Patient Name: Damaris Andrews  : 1960  MRN: [id number]  Today's Date: 2025      Visit Date: 2025    Patient Active Problem List   Diagnosis    Right thigh pain    Lipoma of left thigh    Arthritis    Breast cancer    High blood pressure    Hypothyroidism    Vaginal atrophy    Heart valve disorder    Women's annual routine gynecological examination    Primary insomnia        Past Medical History:   Diagnosis Date    Breast cancer     RIGHT MASECTOMY    Disease of thyroid gland     Drug therapy     Frequent nosebleeds     Hx of radiation therapy     Hypertension         Past Surgical History:   Procedure Laterality Date    ABDOMINOPLASTY       SECTION      x2    COLONOSCOPY      DR. NOLASCO, DR. DELGADILLO    COLONOSCOPY N/A 2022    Procedure: COLONOSCOPY FOR SCREENING;  Surgeon: Star Covington MD;  Location: [facility] ENDOSCOPY;  Service: Gastroenterology;  Laterality: N/A;  diverticulosis    MASTECTOMY Right     VENOUS ACCESS DEVICE (PORT) INSERTION AND REMOVAL           Visit Dx:     ICD-10-CM ICD-9-CM   1. Lymphedema  I89.0 457.1   2. Malignant neoplasm of upper-outer quadrant of right breast in female, estrogen receptor positive  C50.411 174.4    Z17.0 V86.0        Patient History       Row Name 25 0900             History    Chief Complaint --  Lymphedema of the right arm  -TD      Brief Description of Current Complaint Damaris is a 64 year old female with stage 3 lymphedema of the right arm.  Damaris had 22 lymphnodes removed during a right side mastecotmy secondary to breast cancer in .  -TD         Fall Risk Assessment    Any falls in the past year: No  -TD      Does patient have a fear of falling No  -TD         Services    Are you currently receiving Home Health services No  -TD      Do you plan to receive Home Health services in the near future No  -TD         Daily Activities    Primary  "Language English  -TD      Are you able to read Yes  -TD      Are you able to write Yes  -TD      How does patient learn best? Listening;Reading;Demonstration;Pictures/Video  -TD         Safety    Are you being hurt, hit, or frightened by anyone at home or in your life? No  -TD      Are you being neglected by a caregiver No  -TD      Have you had any of the following issues with N/A  -TD                User Key  (r) = Recorded By, (t) = Taken By, (c) = Cosigned By      Initials Name Provider Type    TD Jessica Ochoa OT Occupational Therapist                     Lymphedema       Row Name 04/16/25 0900             Subjective Pain    Able to rate subjective pain? yes  -TD      Pre-Treatment Pain Level 0  -TD      Post-Treatment Pain Level 0  -TD         Subjective    Subjective Comments Pt is making good progress towards goals  -TD         Lymphedema Assessment    Lymphedema Classification RUE:;stage 3 (Lymphostatic Elephantiasis)  -TD      Lymphedema Cancer Related Sx right;radical mastectomy;sentinel node biopsy  -TD      Lymphedema Surgery Comments 1998  -TD      Lymph Nodes Removed # 22  -TD      Positive Lymph Nodes # 18  -TD         LLIS - Physical Concerns    The amount of pain associated with my lymphedema is: 0  -TD      The amount of limb heaviness associated with my lymphedema is: 1  -TD      The amount of skin tightness associated with my lymphedema is: 1  -TD      The size of my swollen limb(s) seems: 1  -TD      Lymphedema affects the movement of my swollen limb(s): 1  -TD      The strength in my swollen limb(s) is: 1  -TD         LLIS - Psychosocial Concerns    Lymphedema affects my body image (i.e., \"how I think I look\"). 2  -TD      Lymphedema affects my socializing with others. 2  -TD      Lymphedema affects my intimate relations with spouse or partner (rate 0 if not applicable 2  -TD      Lymphedema \"gets me down\" (i.e., depression, frustration, or anger) 2  -TD      I must rely on others for help " due to my lymphedema. 2  -TD      I know what to do to manage my lymphedema 2  -TD         LLIS - Functional Concerns    Lymphedema affects my ability to perform self-care activities (i.e. eating, dressing, hygiene) 2  -TD      Lymphedema affects my ability to perform routine home or work-related activities. 2  -TD      Lymphedema affects my performance of preferred leisure activities. 2  -TD      Lymphedema affects proper fit of clothing/shoes 2  -TD      Lymphedema affects my sleep 2  -TD         Posture/Observations    Posture- WNL Posture is WNL  -TD         General ROM    GENERAL ROM COMMENTS BUE are WFL  -TD         MMT (Manual Muscle Testing)    General MMT Comments BUE are WFL  -TD         Manual Lymphatic Drainage    Manual Lymphatic Drainage initial sequence;opened regional lymph nodes;opened anastamoses;extremity treatment  -TD      Initial Sequence short neck;cervical;supraclavicular;shoulder collectors;abdomen  -TD      Cervical right;left  -TD      Supraclavicular right;left  -TD      Shoulder Collectors right;left  -TD      Abdomen superficial  -TD      Opened Regional Lymph Nodes axillary;inguinal;ribs;paraspinal  -TD      Axillary right;left  -TD      Inguinal right  -TD      Opened Anastamoses anterior axillo-axillary;posterior axillo-axillary;axillo-inguinal  -TD      Extremity Treatment MLD to full limb  -TD      Manual Therapy Therapist PORI protocol to address tightness and MLD to reduce volume.  -TD         Compression/Skin Care    Compression/Skin Care skin care;wrapping location;bandaging  -TD      Skin Care lotion applied  -TD      Wrapping Location upper extremity  -TD      Wrapping Location UE right:;fingers to axilla  -TD      Bandage Layers cotton liner;padding/fluff layer;full limb;short-stretch bandages (comment size/quantity)  -TD      Bandaging Technique circumferential/spiral;moderate compression  -TD         Lymphedema Life Impact Scale Totals    A.  Total Q1 - Q17 (Do not include  Q18) 27  -TD      B.  Total number of questions answered (Q1-Q17) 17  -TD      C. Divide A by B 1.59  -TD      D. Multiple C by 25 39.75  -TD                User Key  (r) = Recorded By, (t) = Taken By, (c) = Cosigned By      Initials Name Provider Type    TD Jessica Ochoa, OT Occupational Therapist                            Therapy Education  Education Details: Reviewed wrapping and MLD  Given: HEP, Symptoms/condition management, Pain management, Edema management  Program: Reinforced  How Provided: Verbal, Demonstration  Provided to: Patient  Level of Understanding: Teach back education performed, Verbalized, Demonstrated      Manual Rx (Last 36 Hours)       Manual Treatments       Row Name 04/16/25 0954             Total Minutes    56086 - OT Manual Therapy Minutes 40  -TD                User Key  (r) = Recorded By, (t) = Taken By, (c) = Cosigned By      Initials Name Provider Type    TD Jessica Ochoa, OT Occupational Therapist                   OT Goals       Row Name 04/16/25 0954          Time Calculation    OT Goal Re-Cert Due Date 05/16/25  -TD               User Key  (r) = Recorded By, (t) = Taken By, (c) = Cosigned By      Initials Name Provider Type    TD Jessica Ochoa, OT Occupational Therapist                  1. Post Breast Surgery Care/at risk for Lymphedema  LTG 1: 90 days:  As an indicator of no exacerbation of lymphedema staging, the patient will present with an L-Dex score less than [10] points from preoperative baseline.              STATUS: on going   STG 1a:   30 days: To prevent exacerbation of mixed edema to lymphedema, patient will utilize the 2 postsurgical compression garments daily.                 STATUS: on going   STG 1b: 30 days: Patient will be independent with self-manual lymphatic massage.               STATUS: on going   STG 1c: 30 days:  Patient will be independent with identification of signs and symptoms of lymphedema exasperation per stoplight to recovery education handout.               STATUS: on going   STG 1 d: 30 days: Patient will be independent with HEP to prevent advancement in lymphedema staging.              STATUS: on going  TREATMENT:  Self Care/ADL retraining, Therapeutic Activity, Neuromuscular Re-education, Therapeutic Exercise, Bioimpedence Fluid Analysis, Post-Surgical compression garement [address] [facility]/ Becky Camisole Kit 2860K, Orthotic Management and training,  and Manual Therapy.      Therapy Education  Education Details: Discussed with patient different therapeutic technique and treatment options.  Discussed options and what would be best to fit into her life.  Given: HEP, Symptoms/condition management, Pain management, Edema management  Program: New  How Provided: Verbal, Demonstration, Written  Provided to: Patient  Level of Understanding: Teach back education performed, Verbalized, Demonstrated   OT Assessment/Plan       Row Name 04/16/25 0952          OT Assessment    Functional Limitations Decreased safety during functional activities;Limitations in functional capacity and performance;Performance in leisure activities;Limitation in home management;Performance in self-care ADL  -TD     Impairments Impaired lymphatic circulation  -TD     Assessment Comments Damaris is a 64 year old female with stage 3 lymphedema of the right arm. Damaris had 22 lymphnodes removed during a right side mastecotmy secondary to breast cancer in 1998. Pt tolerated treatment well and is making good progress towards goals.  Pt will be transitioned to reduction kit hopefully next session. Patient would benefit from continued skilled occupational therapy to prevent increased staging of lymphedema, increased pain, and decreased range of motion.  -TD     OT Diagnosis lymphedema  -TD     OT Rehab Potential Good  -TD     Patient/caregiver participated in establishment of treatment plan and goals Yes  -TD     Patient would benefit from skilled therapy intervention Yes  -TD        OT Plan     OT Frequency 1x/week;2x/week  -TD     Predicted Duration of Therapy Intervention (OT) 6 weeks  -TD     Planned CPT's? OT EVAL LOW COMPLEXITY: 17037;OT RE-EVAL: 39837;OT THER ACT EA 15 MIN: 05037JJ;OT THER PROC EA 15 MIN: 04654UU;OT SELF CARE/MGMT/TRAIN 15 MIN: 59133;OT MANUAL THERAPY EA 15 MIN: 56473;OT BIS XTRACELL FLUID ANALYSIS: 13875;OT CARE PLAN EA 15 MIN;OT ORTHOTIC MGMT/TRAIN EA 15 MIN: 24517;OT ORTHO/PROSTHET CHECKOUT EA 15 MIN: 87387  -TD     Planned Therapy Interventions (Optional Details) home exercise program;manual therapy techniques;orthotic fitting/training;patient/family education;prosthetic fitting/training;ROM (Range of Motion);strengthening;stretching  -TD     OT Plan Comments continue POC  -TD               User Key  (r) = Recorded By, (t) = Taken By, (c) = Cosigned By      Initials Name Provider Type    TD Jessica Ochoa OT Occupational Therapist                              Time Calculation:   Timed Charges  85659 - OT Manual Therapy Minutes: 40  Total Minutes  Timed Charges Total Minutes: 40   Total Minutes: 40     Therapy Charges for Today       Code Description Service Date Service Provider Modifiers Qty    08964600169 HC OT MANUAL THERAPY EA 15 MIN 4/16/2025 Jessica Ochoa OT GO 3                      Jessica Ochoa OT  4/16/2025

## 2025-04-21 ENCOUNTER — HOSPITAL ENCOUNTER (OUTPATIENT)
Facility: HOSPITAL | Age: 65
Setting detail: THERAPIES SERIES
Discharge: HOME | End: 2025-04-21
Payer: COMMERCIAL

## 2025-04-21 DIAGNOSIS — C50.411 MALIGNANT NEOPLASM OF UPPER-OUTER QUADRANT OF RIGHT BREAST IN FEMALE, ESTROGEN RECEPTOR POSITIVE: ICD-10-CM

## 2025-04-21 DIAGNOSIS — I89.0 LYMPHEDEMA: Primary | ICD-10-CM

## 2025-04-21 DIAGNOSIS — Z17.0 MALIGNANT NEOPLASM OF UPPER-OUTER QUADRANT OF RIGHT BREAST IN FEMALE, ESTROGEN RECEPTOR POSITIVE: ICD-10-CM

## 2025-04-21 PROCEDURE — 97535 SELF CARE MNGMENT TRAINING: CPT | Performed by: OCCUPATIONAL THERAPIST

## 2025-04-21 NOTE — THERAPY TREATMENT NOTE
Outpatient Occupational Therapy Lymphedema Treatment Note   [facility]     Patient Name: Damaris Andrews  : 1960  MRN: [id number]  Today's Date: 2025      Visit Date: 2025    Patient Active Problem List   Diagnosis    Right thigh pain    Lipoma of left thigh    Arthritis    Breast cancer    High blood pressure    Hypothyroidism    Vaginal atrophy    Heart valve disorder    Women's annual routine gynecological examination    Primary insomnia        Past Medical History:   Diagnosis Date    Breast cancer     RIGHT MASECTOMY    Disease of thyroid gland     Drug therapy     Frequent nosebleeds     Hx of radiation therapy     Hypertension         Past Surgical History:   Procedure Laterality Date    ABDOMINOPLASTY       SECTION      x2    COLONOSCOPY      DR. NOLASCO, DR. DELGADILLO    COLONOSCOPY N/A 2022    Procedure: COLONOSCOPY FOR SCREENING;  Surgeon: Star Covington MD;  Location: [facility] ENDOSCOPY;  Service: Gastroenterology;  Laterality: N/A;  diverticulosis    MASTECTOMY Right     VENOUS ACCESS DEVICE (PORT) INSERTION AND REMOVAL           Visit Dx:      ICD-10-CM ICD-9-CM   1. Lymphedema  I89.0 457.1   2. Malignant neoplasm of upper-outer quadrant of right breast in female, estrogen receptor positive  C50.411 174.4    Z17.0 V86.0        Lymphedema       Row Name 25 1000             Subjective Pain    Able to rate subjective pain? yes  -TD      Pre-Treatment Pain Level 0  -TD      Post-Treatment Pain Level 0  -TD         Subjective    Subjective Comments Pt brought in her reduction kit  -TD         Lymphedema Assessment    Lymphedema Classification RUE:;stage 3 (Lymphostatic Elephantiasis)  -TD      Lymphedema Cancer Related Sx right;radical mastectomy;sentinel node biopsy  -TD      Lymphedema Surgery Comments 1998  -TD      Lymph Nodes Removed # 22  -TD      Positive Lymph Nodes # 18  -TD         Compression/Skin Care    Compression/Skin Care compression garment  -TD       Compression Garment Comments Therapist fit and education pt on reduction kit  -TD                User Key  (r) = Recorded By, (t) = Taken By, (c) = Cosigned By      Initials Name Provider Type    Jessica Esqueda OT Occupational Therapist                             OT Assessment/Plan       Row Name 04/21/25 1014          OT Assessment    Functional Limitations Decreased safety during functional activities;Limitations in functional capacity and performance;Performance in leisure activities;Limitation in home management;Performance in self-care ADL  -TD     Impairments Impaired lymphatic circulation  -TD     Assessment Comments Damaris is a 64 year old female with stage 3 lymphedema of the right arm. Damaris had 22 lymphnodes removed during a right side mastecotmy secondary to breast cancer in 1998. Pt tolerated treatment well and is making good progress towards goals.  Pt brought in her reduction to kit and it was fit to her and then educated on the wear schedule.  Patient would benefit from continued skilled occupational therapy to prevent increased staging of lymphedema, increased pain, and decreased range of motion.  -TD     OT Diagnosis lmphedema  -TD     OT Rehab Potential Good  -TD     Patient/caregiver participated in establishment of treatment plan and goals Yes  -TD     Patient would benefit from skilled therapy intervention Yes  -TD        OT Plan    OT Plan Comments continue POC  -TD               User Key  (r) = Recorded By, (t) = Taken By, (c) = Cosigned By      Initials Name Provider Type    Jessica Esqueda OT Occupational Therapist                              Therapy Education  Education Details: Pt taught the use of the reduction kit and wear schedule  Given: HEP, Symptoms/condition management, Pain management, Edema management  Program: Reinforced  How Provided: Verbal, Demonstration  Provided to: Patient  Level of Understanding: Teach back education performed, Verbalized, Demonstrated  94194 - OT  Self Care/Mgmt Minutes: 25                Time Calculation:   Timed Charges  13050 - OT Self Care/Mgmt Minutes: 25  Total Minutes  Timed Charges Total Minutes: 25   Total Minutes: 25     Therapy Charges for Today       Code Description Service Date Service Provider Modifiers Qty    12222329908  OT SELF CARE/MGMT/TRAIN EA 15 MIN 4/21/2025 Jessica Ochoa OT GO 2                        Jessica Ochoa OT  4/21/2025

## 2025-04-28 ENCOUNTER — HOSPITAL ENCOUNTER (OUTPATIENT)
Facility: HOSPITAL | Age: 65
Setting detail: THERAPIES SERIES
Discharge: HOME | End: 2025-04-28
Payer: COMMERCIAL

## 2025-04-28 DIAGNOSIS — I89.0 LYMPHEDEMA: Primary | ICD-10-CM

## 2025-04-28 DIAGNOSIS — Z17.0 MALIGNANT NEOPLASM OF UPPER-OUTER QUADRANT OF RIGHT BREAST IN FEMALE, ESTROGEN RECEPTOR POSITIVE: ICD-10-CM

## 2025-04-28 DIAGNOSIS — C50.411 MALIGNANT NEOPLASM OF UPPER-OUTER QUADRANT OF RIGHT BREAST IN FEMALE, ESTROGEN RECEPTOR POSITIVE: ICD-10-CM

## 2025-04-28 PROCEDURE — 97140 MANUAL THERAPY 1/> REGIONS: CPT | Performed by: OCCUPATIONAL THERAPIST

## 2025-04-28 NOTE — THERAPY TREATMENT NOTE
Outpatient Occupational Therapy Lymphedema Treatment Note   [facility]     Patient Name: Damaris Andrews  : 1960  MRN: [id number]  Today's Date: 2025      Visit Date: 2025    Patient Active Problem List   Diagnosis    Right thigh pain    Lipoma of left thigh    Arthritis    Breast cancer    High blood pressure    Hypothyroidism    Vaginal atrophy    Heart valve disorder    Women's annual routine gynecological examination    Primary insomnia        Past Medical History:   Diagnosis Date    Breast cancer     RIGHT MASECTOMY    Disease of thyroid gland     Drug therapy     Frequent nosebleeds     Hx of radiation therapy     Hypertension         Past Surgical History:   Procedure Laterality Date    ABDOMINOPLASTY       SECTION      x2    COLONOSCOPY      DR. NOLASCO, DR. DELGADILLO    COLONOSCOPY N/A 2022    Procedure: COLONOSCOPY FOR SCREENING;  Surgeon: Star Covington MD;  Location: [facility] ENDOSCOPY;  Service: Gastroenterology;  Laterality: N/A;  diverticulosis    MASTECTOMY Right     VENOUS ACCESS DEVICE (PORT) INSERTION AND REMOVAL           Visit Dx:      ICD-10-CM ICD-9-CM   1. Lymphedema  I89.0 457.1   2. Malignant neoplasm of upper-outer quadrant of right breast in female, estrogen receptor positive  C50.411 174.4    Z17.0 V86.0        Lymphedema       Row Name 25 0900             Subjective Pain    Able to rate subjective pain? yes  -TD      Pre-Treatment Pain Level 0  -TD      Post-Treatment Pain Level 0  -TD         Subjective    Subjective Comments Pt is ready to peform the activities independently  -TD         Lymphedema Assessment    Lymphedema Classification RUE:;stage 3 (Lymphostatic Elephantiasis)  -TD      Lymphedema Cancer Related Sx right;radical mastectomy;sentinel node biopsy  -TD      Lymphedema Surgery Comments 1998  -TD      Lymph Nodes Removed # 22  -TD      Positive Lymph Nodes # 18  -TD         Manual Lymphatic Drainage    Manual Lymphatic Drainage  initial sequence;opened regional lymph nodes;opened anastamoses;extremity treatment  -TD      Initial Sequence short neck;cervical;supraclavicular;shoulder collectors;abdomen  -TD      Cervical right;left  -TD      Supraclavicular right;left  -TD      Shoulder Collectors right;left  -TD      Abdomen superficial  -TD      Opened Regional Lymph Nodes axillary;inguinal;ribs;paraspinal  -TD      Axillary right;left  -TD      Inguinal right  -TD      Opened Anastamoses anterior axillo-axillary;posterior axillo-axillary;axillo-inguinal  -TD      Extremity Treatment MLD to full limb  -TD      Manual Therapy Therapsit completed PORI protocol to  volume.  -TD         Compression/Skin Care    Compression Garment Comments Reduction kit was place this date  -TD                User Key  (r) = Recorded By, (t) = Taken By, (c) = Cosigned By      Initials Name Provider Type    Jessica Esqueda OT Occupational Therapist                             OT Assessment/Plan       Row Name 25 1001          OT Assessment    Functional Limitations Decreased safety during functional activities;Limitations in functional capacity and performance;Performance in leisure activities;Limitation in home management;Performance in self-care ADL  -TD     Impairments Impaired lymphatic circulation  -TD     Assessment Comments Damaris is a 64 year old female with stage 3 lymphedema of the right arm. Damaris had 22 lymphnodes removed during a right side mastecotmy secondary to breast cancer in . Pt tolerated treatment well and is making good progress towards goals and feels like she can use reduction kit independently and pt will be acccessed for reduction in one month.  Patient would benefit from continued skilled occupational therapy to prevent increased staging of lymphedema, increased pain, and decreased range of motion.  -TD     OT Diagnosis lymphedema  -TD     OT Rehab Potential Good  -TD     Patient/caregiver participated in establishment  of treatment plan and goals Yes  -TD     Patient would benefit from skilled therapy intervention Yes  -TD        OT Plan    OT Frequency --  see duration  -TD     Predicted Duration of Therapy Intervention (OT) pt will be seen in 1 month  -TD     Planned CPT's? OT EVAL LOW COMPLEXITY: 91626;OT RE-EVAL: 01706;OT THER ACT EA 15 MIN: 97007IK;OT THER PROC EA 15 MIN: 60661LC;OT SELF CARE/MGMT/TRAIN 15 MIN: 31029;OT MANUAL THERAPY EA 15 MIN: 18138;OT BIS XTRACELL FLUID ANALYSIS: 98478;OT CARE PLAN EA 15 MIN;OT ORTHOTIC MGMT/TRAIN EA 15 MIN: 77459;OT ORTHO/PROSTHET CHECKOUT EA 15 MIN: 74004  -TD     Planned Therapy Interventions (Optional Details) home exercise program;manual therapy techniques;orthotic fitting/training;patient/family education;prosthetic fitting/training;ROM (Range of Motion);strengthening;stretching  -TD     OT Plan Comments continue POC  -TD               User Key  (r) = Recorded By, (t) = Taken By, (c) = Cosigned By      Initials Name Provider Type    Jessica Esqueda OT Occupational Therapist                        Manual Rx (Last 36 Hours)       Manual Treatments       Row Name 04/28/25 1003             Total Minutes    52966 - OT Manual Therapy Minutes 30  -TD                User Key  (r) = Recorded By, (t) = Taken By, (c) = Cosigned By      Initials Name Provider Type    Jessica Esqueda OT Occupational Therapist                      Therapy Education  Education Details: Therapist reviewed use of reduction kit and fit  Given: HEP, Symptoms/condition management, Pain management, Edema management  Program: Reinforced  How Provided: Verbal, Demonstration  Provided to: Patient  Level of Understanding: Teach back education performed, Verbalized, Demonstrated                Time Calculation:   Timed Charges  39747 - OT Manual Therapy Minutes: 30  Total Minutes  Timed Charges Total Minutes: 30   Total Minutes: 30     Therapy Charges for Today       Code Description Service Date Service Provider  Modifiers Qty    [id number]  OT MANUAL THERAPY EA 15 MIN 4/28/2025 Jessica Ochoa OT GO 2                        Jessica Ochoa OT  4/28/2025

## 2025-06-02 ENCOUNTER — APPOINTMENT (OUTPATIENT)
Facility: HOSPITAL | Age: 65
End: 2025-06-02
Payer: COMMERCIAL

## 2025-06-16 ENCOUNTER — HOSPITAL ENCOUNTER (OUTPATIENT)
Facility: HOSPITAL | Age: 65
Setting detail: THERAPIES SERIES
Discharge: HOME | End: 2025-06-16
Payer: COMMERCIAL

## 2025-06-16 ENCOUNTER — TELEPHONE (OUTPATIENT)
Facility: HOSPITAL | Age: 65
End: 2025-06-16
Payer: COMMERCIAL

## 2025-06-16 DIAGNOSIS — Z17.0 MALIGNANT NEOPLASM OF UPPER-OUTER QUADRANT OF RIGHT BREAST IN FEMALE, ESTROGEN RECEPTOR POSITIVE: ICD-10-CM

## 2025-06-16 DIAGNOSIS — I89.0 LYMPHEDEMA: Primary | ICD-10-CM

## 2025-06-16 DIAGNOSIS — C50.411 MALIGNANT NEOPLASM OF UPPER-OUTER QUADRANT OF RIGHT BREAST IN FEMALE, ESTROGEN RECEPTOR POSITIVE: ICD-10-CM

## 2025-06-16 PROCEDURE — 97535 SELF CARE MNGMENT TRAINING: CPT | Performed by: OCCUPATIONAL THERAPIST

## 2025-06-16 PROCEDURE — 93702 BIS XTRACELL FLUID ANALYSIS: CPT | Performed by: OCCUPATIONAL THERAPIST

## 2025-06-16 NOTE — THERAPY RE-EVALUATION
Outpatient Occupational Therapy Lymphedema Re-Evaluation   [place]     Patient Name: Damaris Andrews  : 1960  MRN: [id number]  Today's Date: 2025      Visit Date: 2025    Patient Active Problem List   Diagnosis    Right thigh pain    Lipoma of left thigh    Arthritis    Breast cancer    High blood pressure    Hypothyroidism    Vaginal atrophy    Heart valve disorder    Women's annual routine gynecological examination    Primary insomnia        Past Medical History:   Diagnosis Date    Breast cancer     RIGHT MASECTOMY    Disease of thyroid gland     Drug therapy     Frequent nosebleeds     Hx of radiation therapy     Hypertension         Past Surgical History:   Procedure Laterality Date    ABDOMINOPLASTY       SECTION      x2    COLONOSCOPY      DR. NOLASCO, DR. DELGADILLO    COLONOSCOPY N/A 2022    Procedure: COLONOSCOPY FOR SCREENING;  Surgeon: Star Covington MD;  Location: [facility] ENDOSCOPY;  Service: Gastroenterology;  Laterality: N/A;  diverticulosis    MASTECTOMY Right     VENOUS ACCESS DEVICE (PORT) INSERTION AND REMOVAL           Visit Dx:     ICD-10-CM ICD-9-CM   1. Lymphedema  I89.0 457.1   2. Malignant neoplasm of upper-outer quadrant of right breast in female, estrogen receptor positive  C50.411 174.4    Z17.0 V86.0            Lymphedema       Row Name 25 1200             Subjective Pain    Able to rate subjective pain? yes  -TD      Pre-Treatment Pain Level 0  -TD      Post-Treatment Pain Level 0  -TD         Subjective    Subjective Comments Pt belives she is headed in the right direciton  -TD         Lymphedema Assessment    Lymphedema Classification RUE:;stage 3 (Lymphostatic Elephantiasis)  -TD      Lymphedema Cancer Related Sx right;radical mastectomy;sentinel node biopsy  -TD      Lymphedema Surgery Comments   -TD      Lymph Nodes Removed # 22  -TD      Positive Lymph Nodes # 18  -TD         LLIS - Physical Concerns    The amount of pain  "associated with my lymphedema is: 0  -TD      The amount of limb heaviness associated with my lymphedema is: 1  -TD      The amount of skin tightness associated with my lymphedema is: 1  -TD      The size of my swollen limb(s) seems: 1  -TD      Lymphedema affects the movement of my swollen limb(s): 1  -TD      The strength in my swollen limb(s) is: 1  -TD         LLIS - Psychosocial Concerns    Lymphedema affects my body image (i.e., \"how I think I look\"). 1  -TD      Lymphedema affects my socializing with others. 1  -TD      Lymphedema affects my intimate relations with spouse or partner (rate 0 if not applicable 1  -TD      Lymphedema \"gets me down\" (i.e., depression, frustration, or anger) 1  -TD      I must rely on others for help due to my lymphedema. 1  -TD      I know what to do to manage my lymphedema 2  -TD         LLIS - Functional Concerns    Lymphedema affects my ability to perform self-care activities (i.e. eating, dressing, hygiene) 0  -TD      Lymphedema affects my ability to perform routine home or work-related activities. 0  -TD      Lymphedema affects my performance of preferred leisure activities. 0  -TD      Lymphedema affects proper fit of clothing/shoes 2  -TD      Lymphedema affects my sleep 0  -TD         Posture/Observations    Posture- WNL Posture is WNL  -TD         General ROM    GENERAL ROM COMMENTS BUE are WFL  -TD         MMT (Manual Muscle Testing)    General MMT Comments BUE are WFL  -TD         Manual Lymphatic Drainage    Manual Lymphatic Drainage initial sequence;opened regional lymph nodes;opened anastamoses;extremity treatment  -TD      Initial Sequence short neck;cervical;supraclavicular;shoulder collectors;abdomen  -TD      Cervical right;left  -TD      Supraclavicular right;left  -TD      Shoulder Collectors right;left  -TD      Abdomen superficial  -TD      Opened Regional Lymph Nodes axillary;inguinal;ribs;paraspinal  -TD      Axillary right;left  -TD      Inguinal right  " -TD      Opened Anastamoses anterior axillo-axillary;posterior axillo-axillary;axillo-inguinal  -TD      Extremity Treatment MLD to full limb  -TD      Manual Therapy Therapist compelted KYM protocol  -TD         Compression/Skin Care    Compression Garment Comments Reduction kit was placed after session  -TD         L-Dex Bioimpedence Screening    L-Dex Measurement Extremity RUE  -TD      L-Dex Patient Position Standing  -TD      L-Dex UE Dominate Side Right  -TD      L-Dex UE At Risk Side Right  -TD      L-Dex UE Pre Surgical Value No  -TD      L-Dex UE Score 22.4  -TD      L-Dex UE Baseline Score 60.4  -TD      L-Dex UE Value Change -38  -TD      $ L-Dex Charge yes  -TD         Lymphedema Life Impact Scale Totals    A.  Total Q1 - Q17 (Do not include Q18) 14  -TD      B.  Total number of questions answered (Q1-Q17) 17  -TD      C. Divide A by B 0.82  -TD      D. Multiple C by 25 20.5  -TD                User Key  (r) = Recorded By, (t) = Taken By, (c) = Cosigned By      Initials Name Provider Type    TD Jessica Ochoa OT Occupational Therapist                     OT Ortho       Row Name 06/16/25 1200             Orthotics & Prosthetics Management    Orthosis Location upper extremity orthosis  ciraid reduction kit right arm standard regular  -TD      Additional Documentation Orthosis Location (Row)  -TD                User Key  (r) = Recorded By, (t) = Taken By, (c) = Cosigned By      Initials Name Provider Type    TD Jessica Ochoa OT Occupational Therapist                    The patient had a follow up SOZO measurement which I reviewed today. The score is   22.4, see scanned to EMR. Bioimpedance spectroscopy helps identify the   onset of lymphedema in an arm or leg before patients experience noticeable swelling. Research has   shown that 92% of patients with early detection of lymphedema using L-Dex combined with   intervention do not progress to chronic lymphedema through three years. Additionally, as of March    2023, the NCCN Guidelines® for Survivorship recommend proactive screening for lymphedema using   bioimpedance spectroscopy. Whenever possible, patients are tested for baseline L-Dex score before   cancer treatment begins and then are reassessed during regular follow-up visits using the SOZO device.   Otherwise, this can be started postoperatively and continued during regular follow-up visits. If the   patient’s L-Dex score increases above normal levels, that is a sign that lymphedema is developing and a   referral is made to physical therapy for further evaluation and early compression treatment.   Lymphedema assessment with the SOZO L-Dex score is recommended to be done every 3 months for   the first 3 years and then every 6 months for years 4 and 5 followed by annually afterwards      Therapy Education  Education Details: Therapist reviewed use of kit  Given: HEP, Symptoms/condition management, Pain management, Edema management  Program: Reinforced  How Provided: Verbal, Demonstration  Provided to: Patient  Level of Understanding: Teach back education performed, Verbalized, Demonstrated  57423 - OT Self Care/Mgmt Minutes: 30         OT Goals       Row Name 06/16/25 1228          Time Calculation    OT Goal Re-Cert Due Date 07/16/25  -TD               User Key  (r) = Recorded By, (t) = Taken By, (c) = Cosigned By      Initials Name Provider Type    Jessica Esqueda OT Occupational Therapist                  1. Post Breast Surgery Care/at risk for Lymphedema  LTG 1: 90 days:  As an indicator of no exacerbation of lymphedema staging, the patient will present with an L-Dex score less than [10] points from preoperative baseline.              STATUS: on going   STG 1a:   30 days: To prevent exacerbation of mixed edema to lymphedema, patient will utilize the 2 postsurgical compression garments daily.                 STATUS: on going   STG 1b: 30 days: Patient will be independent with self-manual lymphatic massage.                STATUS: on going   STG 1c: 30 days:  Patient will be independent with identification of signs and symptoms of lymphedema exasperation per stoplight to recovery education handout.              STATUS: on going   STG 1 d: 30 days: Patient will be independent with HEP to prevent advancement in lymphedema staging.              STATUS: on going  TREATMENT:  Self Care/ADL retraining, Therapeutic Activity, Neuromuscular Re-education, Therapeutic Exercise, Bioimpedence Fluid Analysis, Post-Surgical compression garement 41849 Mira Zip-ST-High/ Becky Camisole Kit 2860K, Orthotic Management and training,  and Manual Therapy.      OT Assessment/Plan       Row Name 06/16/25 1225          OT Assessment    Functional Limitations Decreased safety during functional activities;Limitations in functional capacity and performance;Performance in leisure activities;Limitation in home management;Performance in self-care ADL  -TD     Impairments Impaired lymphatic circulation  -TD     Assessment Comments Damaris is a 64 year old female with stage 3 lymphedema of the right arm. Damaris had 22 lymphnodes removed during a right side mastecotmy secondary to breast cancer in 1998. Pt ismaking good progress twoards gaols and using reduction kit daily.  Pt would like a second reduction kit to wear more often Patient would benefit from continued skilled occupational therapy to prevent increased staging of lymphedema, increased pain, and decreased range of motion.  -TD     OT Diagnosis lymphedema  -TD     OT Rehab Potential Good  -TD     Patient/caregiver participated in establishment of treatment plan and goals Yes  -TD     Patient would benefit from skilled therapy intervention Yes  -TD        OT Plan    OT Frequency --  see duration  -TD     Predicted Duration of Therapy Intervention (OT) Pt will be seen in 1 month for monitor progress with reduction kit  -TD     Planned CPT's? OT EVAL LOW COMPLEXITY: 93504;OT RE-EVAL: 19698;OT THER ACT EA  15 MIN: 40816JQ;OT THER PROC EA 15 MIN: 82159HP;OT SELF CARE/MGMT/TRAIN 15 MIN: 46862;OT MANUAL THERAPY EA 15 MIN: 90326;OT BIS XTRACELL FLUID ANALYSIS: 52298;OT CARE PLAN EA 15 MIN;OT ORTHOTIC MGMT/TRAIN EA 15 MIN: 88438;OT ORTHO/PROSTHET CHECKOUT EA 15 MIN: 66338  -TD     Planned Therapy Interventions (Optional Details) home exercise program;manual therapy techniques;orthotic fitting/training;patient/family education;prosthetic fitting/training;ROM (Range of Motion);strengthening;stretching  -TD     OT Plan Comments continue POC  -TD               User Key  (r) = Recorded By, (t) = Taken By, (c) = Cosigned By      Initials Name Provider Type    TD Jessica Ochoa OT Occupational Therapist                              Time Calculation:   Timed Charges  59998 - OT Self Care/Mgmt Minutes: 30  Total Minutes  Timed Charges Total Minutes: 30   Total Minutes: 30     Therapy Charges for Today       Code Description Service Date Service Provider Modifiers Qty    78444305835 HC PT BIS XTRACELL FLUID ANALYSIS 6/16/2025 Jessica Ochoa OT  1    25284932521 HC OT SELF CARE/MGMT/TRAIN EA 15 MIN 6/16/2025 Jessica Ochoa OT GO 2                      Jessica Ochoa OT  6/16/2025

## 2025-07-07 ENCOUNTER — APPOINTMENT (OUTPATIENT)
Facility: HOSPITAL | Age: 65
End: 2025-07-07
Payer: COMMERCIAL

## 2025-07-14 ENCOUNTER — HOSPITAL ENCOUNTER (OUTPATIENT)
Facility: HOSPITAL | Age: 65
Setting detail: THERAPIES SERIES
Discharge: HOME | End: 2025-07-14
Payer: COMMERCIAL

## 2025-07-14 ENCOUNTER — OFFICE VISIT (OUTPATIENT)
Dept: OBSTETRICS AND GYNECOLOGY | Age: 65
End: 2025-07-14
Payer: COMMERCIAL

## 2025-07-14 VITALS
DIASTOLIC BLOOD PRESSURE: 73 MMHG | SYSTOLIC BLOOD PRESSURE: 118 MMHG | WEIGHT: 138 LBS | HEART RATE: 93 BPM | BODY MASS INDEX: 26.06 KG/M2 | HEIGHT: 61 IN

## 2025-07-14 DIAGNOSIS — Z17.0 MALIGNANT NEOPLASM OF UPPER-OUTER QUADRANT OF RIGHT BREAST IN FEMALE, ESTROGEN RECEPTOR POSITIVE: ICD-10-CM

## 2025-07-14 DIAGNOSIS — Z01.419 WOMEN'S ANNUAL ROUTINE GYNECOLOGICAL EXAMINATION: Primary | ICD-10-CM

## 2025-07-14 DIAGNOSIS — C50.411 MALIGNANT NEOPLASM OF UPPER-OUTER QUADRANT OF RIGHT BREAST IN FEMALE, ESTROGEN RECEPTOR POSITIVE: ICD-10-CM

## 2025-07-14 DIAGNOSIS — N95.2 VAGINAL ATROPHY: ICD-10-CM

## 2025-07-14 DIAGNOSIS — I89.0 LYMPHEDEMA: Primary | ICD-10-CM

## 2025-07-14 PROCEDURE — 97140 MANUAL THERAPY 1/> REGIONS: CPT | Performed by: OCCUPATIONAL THERAPIST

## 2025-07-14 PROCEDURE — 99396 PREV VISIT EST AGE 40-64: CPT | Performed by: OBSTETRICS & GYNECOLOGY

## 2025-07-14 PROCEDURE — G0123 SCREEN CERV/VAG THIN LAYER: HCPCS | Performed by: OBSTETRICS & GYNECOLOGY

## 2025-07-14 RX ORDER — TRIAMCINOLONE ACETONIDE 1 MG/G
CREAM TOPICAL
COMMUNITY
Start: 2025-06-27

## 2025-07-14 RX ORDER — ESTRADIOL 10 UG/1
1 TABLET, FILM COATED VAGINAL 2 TIMES WEEKLY
Qty: 24 TABLET | Refills: 3 | Status: SHIPPED | OUTPATIENT
Start: 2025-07-14

## 2025-07-14 RX ORDER — ESTRADIOL 10 UG/1
1 TABLET, FILM COATED VAGINAL 2 TIMES WEEKLY
Qty: 24 TABLET | Refills: 3 | Status: SHIPPED | OUTPATIENT
Start: 2025-07-14 | End: 2025-07-14

## 2025-07-14 NOTE — ASSESSMENT & PLAN NOTE
Symptoms are stable, and well-controlled with the Vagifem 10 mcg tablets.  She will continue 1 tablet per vagina twice per week

## 2025-07-14 NOTE — PROGRESS NOTES
"Well Woman Visit    CC: Well woman visit    Subjective:   64 y.o. who presents for a well woman exam.  The patient has no concerns today.  She reports that she is doing well with the Vagifem.  She only has to use the Estrace additionally very occasionally.    Last PAP:   Last Completed Pap Smear            Awaiting Completion       PAP SMEAR (Every 3 Years) Order placed this encounter      2025  Order placed for IGP,rfx Aptima HPV All Pth by Tomas Morales MD    2024  IGP,rfx Aptima HPV All Pth    2023  IGP,rfx Aptima HPV All Pth    2022  IGP,rfx Aptima HPV All Pth    2021  SCANNED - PAP SMEAR     Only the first 5 history entries have been loaded, but more history exists.                        Last MMG:   Last Completed Mammogram            Awaiting Completion       MAMMOGRAM (Every 2 Years) Order placed this encounter      2025  Order placed for Mammo Screening Digital Tomosynthesis Bilateral With CAD by Tomas Morales MD    2025  Mammo Screening Modified With Tomosynthesis Left With CAD    2023  Mammo Screening Modified With Tomosynthesis Left With CAD    2021  Mammo Screening Modified With Tomosynthesis Left With CAD    2020  Mammo Screening Modified With Tomosynthesis Left With CAD      Only the first 5 history entries have been loaded, but more history exists.                             History: PMHx, Meds, Allergies, PSHx, Social Hx, and POBHx all reviewed and updated.    /73   Pulse 93   Ht 154.9 cm (61\")   Wt 62.6 kg (138 lb)   BMI 26.07 kg/m²     Physical Exam  Vitals and nursing note reviewed. Exam conducted with a chaperone present.   Constitutional:       General: She is not in acute distress.     Appearance: Normal appearance. She is not ill-appearing.   HENT:      Head: Normocephalic and atraumatic.      Mouth/Throat:      Mouth: Mucous membranes are moist.      Pharynx: Oropharynx is clear.   Eyes:      " Extraocular Movements: Extraocular movements intact.   Neck:      Thyroid: No thyroid mass or thyromegaly.   Chest:   Breasts:     Breasts are symmetrical.      Left: Normal. No swelling, bleeding, inverted nipple, mass, nipple discharge, skin change or tenderness.   Abdominal:      General: There is no distension.      Palpations: Abdomen is soft. There is no mass.      Tenderness: There is no abdominal tenderness.      Hernia: There is no hernia in the left inguinal area or right inguinal area.   Genitourinary:     General: Normal vulva.      Exam position: Lithotomy position.      Pubic Area: No rash.       Labia:         Right: No rash, tenderness, lesion or injury.         Left: No rash, tenderness, lesion or injury.       Urethra: No prolapse, urethral pain or urethral lesion.      Vagina: Normal. No vaginal discharge, erythema, tenderness, bleeding or prolapsed vaginal walls.      Cervix: Normal. No friability, lesion, erythema or cervical bleeding.      Uterus: Normal. Not enlarged, not fixed, not tender and no uterine prolapse.       Adnexa:         Right: No mass or tenderness.          Left: No mass or tenderness.     Musculoskeletal:         General: No swelling.      Right lower leg: No edema.      Left lower leg: No edema.   Lymphadenopathy:      Upper Body:      Right upper body: No supraclavicular or axillary adenopathy.      Left upper body: No supraclavicular or axillary adenopathy.   Skin:     General: Skin is warm and dry.      Findings: No rash.   Neurological:      Mental Status: She is alert and oriented to person, place, and time.   Psychiatric:         Mood and Affect: Mood normal.         Behavior: Behavior normal.         Thought Content: Thought content normal.         Assessment and Plan:  Diagnoses and all orders for this visit:    1. Women's annual routine gynecological examination (Primary)  Assessment & Plan:  Pap  Mammogram  Colonoscopy is up-to-date  Recommend daily calcium and  vitamin D    Orders:  -     IGP,rfx Aptima HPV All Pth  -     Mammo Screening Digital Tomosynthesis Bilateral With CAD; Future    2. Vaginal atrophy  Assessment & Plan:  Symptoms are stable, and well-controlled with the Vagifem 10 mcg tablets.  She will continue 1 tablet per vagina twice per week    Orders:  -     Discontinue: estradiol (Vagifem) 10 MCG tablet vaginal tablet; Insert 1 tablet into the vagina 2 (Two) Times a Week.  Dispense: 24 tablet; Refill: 3  -     estradiol (Vagifem) 10 MCG tablet vaginal tablet; Insert 1 tablet into the vagina 2 (Two) Times a Week.  Dispense: 24 tablet; Refill: 3          Preventative:   MMG  Recommend FLU vaccine this season, R/B discussed  s/p COVID vaccine  COVID booster recommended\    She understands the importance of having any ordered tests to be performed in a timely fashion.  The risks of not performing them include, but are not limited to, advanced cancer stages, bone loss from osteoporosis and/or subsequent increase in morbidity and/or mortality.  She is encouraged to review her results online and/or contact or office if she has questions.     Follow Up:  Return for Annual physical.    Tomas Morales MD  07/14/2025

## 2025-07-14 NOTE — THERAPY RE-EVALUATION
Outpatient Occupational Therapy Lymphedema Re-Evaluation   [place]     Patient Name: Damaris Andrews  : 1960  MRN: [id number]  Today's Date: 2025      Visit Date: 2025    Patient Active Problem List   Diagnosis    Right thigh pain    Lipoma of left thigh    Arthritis    Breast cancer    High blood pressure    Hypothyroidism    Vaginal atrophy    Heart valve disorder    Women's annual routine gynecological examination    Primary insomnia        Past Medical History:   Diagnosis Date    Breast cancer     RIGHT MASECTOMY    Disease of thyroid gland     Drug therapy     Frequent nosebleeds     Hx of radiation therapy     Hypertension         Past Surgical History:   Procedure Laterality Date    ABDOMINOPLASTY       SECTION      x2    COLONOSCOPY      DR. NOLASCO, DR. DELGADILLO    COLONOSCOPY N/A 2022    Procedure: COLONOSCOPY FOR SCREENING;  Surgeon: Star Covington MD;  Location: [facility] ENDOSCOPY;  Service: Gastroenterology;  Laterality: N/A;  diverticulosis    MASTECTOMY Right     VENOUS ACCESS DEVICE (PORT) INSERTION AND REMOVAL           Visit Dx:     ICD-10-CM ICD-9-CM   1. Lymphedema  I89.0 457.1   2. Malignant neoplasm of upper-outer quadrant of right breast in female, estrogen receptor positive  C50.411 174.4    Z17.0 V86.0        Patient History       Row Name 25 0900             History    Chief Complaint --  Lymphedema of the right arm  -TD      Brief Description of Current Complaint Damaris is a 64 year old female with stage 3 lymphedema of the right arm.  Damaris had 22 lymphnodes removed during a right side mastecotmy secondary to breast cancer in .  -TD         Fall Risk Assessment    Any falls in the past year: No  -TD      Does patient have a fear of falling No  -TD         Services    Are you currently receiving Home Health services No  -TD      Do you plan to receive Home Health services in the near future No  -TD         Daily Activities    Primary  "Language English  -TD      Are you able to read Yes  -TD      Are you able to write Yes  -TD      How does patient learn best? Listening;Reading;Demonstration;Pictures/Video  -TD         Safety    Are you being hurt, hit, or frightened by anyone at home or in your life? No  -TD      Are you being neglected by a caregiver No  -TD      Have you had any of the following issues with N/A  -TD                User Key  (r) = Recorded By, (t) = Taken By, (c) = Cosigned By      Initials Name Provider Type    TD Jessica Ochoa OT Occupational Therapist                     Lymphedema       Row Name 07/14/25 0900             Subjective Pain    Able to rate subjective pain? yes  -TD      Pre-Treatment Pain Level 0  -TD      Post-Treatment Pain Level 0  -TD         Subjective    Subjective Comments Pt believes that the swelling has maintained.  -TD         Lymphedema Assessment    Lymphedema Classification RUE:;stage 3 (Lymphostatic Elephantiasis)  -TD      Lymphedema Cancer Related Sx right;radical mastectomy;sentinel node biopsy  -TD      Lymphedema Surgery Comments 1998  -TD      Lymph Nodes Removed # 22  -TD      Positive Lymph Nodes # 18  -TD         LLIS - Physical Concerns    The amount of pain associated with my lymphedema is: 0  -TD      The amount of limb heaviness associated with my lymphedema is: 1  -TD      The amount of skin tightness associated with my lymphedema is: 1  -TD      The size of my swollen limb(s) seems: 0  -TD      Lymphedema affects the movement of my swollen limb(s): 0  -TD      The strength in my swollen limb(s) is: 0  -TD         LLIS - Psychosocial Concerns    Lymphedema affects my body image (i.e., \"how I think I look\"). 1  -TD      Lymphedema affects my socializing with others. 1  -TD      Lymphedema affects my intimate relations with spouse or partner (rate 0 if not applicable 0  -TD      Lymphedema \"gets me down\" (i.e., depression, frustration, or anger) 1  -TD      I must rely on others for " help due to my lymphedema. 1  -TD      I know what to do to manage my lymphedema 1  -TD         LLIS - Functional Concerns    Lymphedema affects my ability to perform self-care activities (i.e. eating, dressing, hygiene) 0  -TD      Lymphedema affects my ability to perform routine home or work-related activities. 0  -TD      Lymphedema affects my performance of preferred leisure activities. 0  -TD      Lymphedema affects proper fit of clothing/shoes 2  -TD      Lymphedema affects my sleep 0  -TD         Posture/Observations    Posture- WNL Posture is WNL  -TD         General ROM    GENERAL ROM COMMENTS BUE are WFL  -TD         MMT (Manual Muscle Testing)    General MMT Comments BUE are WFL  -TD         Manual Lymphatic Drainage    Manual Lymphatic Drainage initial sequence;opened regional lymph nodes;opened anastamoses;extremity treatment  -TD      Initial Sequence short neck;cervical;supraclavicular;shoulder collectors;abdomen  -TD      Cervical right;left  -TD      Supraclavicular right;left  -TD      Shoulder Collectors right;left  -TD      Abdomen superficial  -TD      Opened Regional Lymph Nodes axillary;inguinal;ribs;paraspinal  -TD      Axillary right;left  -TD      Inguinal right  -TD      Opened Anastamoses anterior axillo-axillary;posterior axillo-axillary;axillo-inguinal  -TD      Extremity Treatment MLD to full limb  -TD      Manual Therapy Therapist completed PORI protocol with trigger point release and MLD to decrease swelling  -TD         Compression/Skin Care    Compression/Skin Care compression garment  -TD         Lymphedema Life Impact Scale Totals    A.  Total Q1 - Q17 (Do not include Q18) 9  -TD      B.  Total number of questions answered (Q1-Q17) 17  -TD      C. Divide A by B 0.53  -TD      D. Multiple C by 25 13.25  -TD                User Key  (r) = Recorded By, (t) = Taken By, (c) = Cosigned By      Initials Name Provider Type    TD Jessica Ochoa OT Occupational Therapist                             Therapy Education  Education Details: Reviewed MLD and compression use  Given: HEP, Symptoms/condition management, Pain management, Edema management  Program: Reinforced  How Provided: Verbal, Demonstration  Provided to: Patient  Level of Understanding: Teach back education performed, Verbalized, Demonstrated      Manual Rx (Last 36 Hours)       Manual Treatments       Row Name 07/14/25 0927             Total Minutes    24937 - OT Manual Therapy Minutes 30  -TD                User Key  (r) = Recorded By, (t) = Taken By, (c) = Cosigned By      Initials Name Provider Type    Jessica Esqueda OT Occupational Therapist                   OT Goals       Row Name 07/14/25 0927          Time Calculation    OT Goal Re-Cert Due Date 08/13/25  -TD               User Key  (r) = Recorded By, (t) = Taken By, (c) = Cosigned By      Initials Name Provider Type    Jessica Esqueda OT Occupational Therapist                  1. Post Breast Surgery Care/at risk for Lymphedema  LTG 1: 90 days:  As an indicator of no exacerbation of lymphedema staging, the patient will present with an L-Dex score less than [10] points from preoperative baseline.              STATUS: on going   STG 1a:   30 days: To prevent exacerbation of mixed edema to lymphedema, patient will utilize the 2 postsurgical compression garments daily.                 STATUS: on going   STG 1b: 30 days: Patient will be independent with self-manual lymphatic massage.               STATUS: on going   STG 1c: 30 days:  Patient will be independent with identification of signs and symptoms of lymphedema exasperation per stoplight to recovery education handout.              STATUS: on going   STG 1 d: 30 days: Patient will be independent with HEP to prevent advancement in lymphedema staging.              STATUS: on going  TREATMENT:  Self Care/ADL retraining, Therapeutic Activity, Neuromuscular Re-education, Therapeutic Exercise, Bioimpedence Fluid Analysis,  Post-Surgical compression garement [address] [facility]/ Becky Camisole Kit 1230K, Orthotic Management and training,  and Manual Therapy.      OT Assessment/Plan       Row Name 07/14/25 0928          OT Assessment    Functional Limitations Decreased safety during functional activities;Limitations in functional capacity and performance;Performance in leisure activities;Limitation in home management;Performance in self-care ADL  -TD     Impairments Impaired lymphatic circulation  -TD     Assessment Comments Damaris is a 64 year old female with stage 3 lymphedema of the right arm. Damaris had 22 lymphnodes removed during a right side mastecotmy secondary to breast cancer in 1998.  Pt reports that she feels much better after treatment and reviewed and developed plan for compression use.  Patient would benefit from continued skilled occupational therapy to prevent increased staging of lymphedema, increased pain, and decreased range of motion.  -TD     OT Diagnosis lymphedema  -TD     OT Rehab Potential Good  -TD     Patient/caregiver participated in establishment of treatment plan and goals Yes  -TD     Patient would benefit from skilled therapy intervention Yes  -TD        OT Plan    OT Frequency --  see duration  -TD     Predicted Duration of Therapy Intervention (OT) Pt will be seen in 2 months for monitoring  -TD     Planned CPT's? OT EVAL LOW COMPLEXITY: 54237;OT RE-EVAL: 02273;OT THER ACT EA 15 MIN: [id number];OT THER PROC EA 15 MIN: [id number];OT SELF CARE/MGMT/TRAIN 15 MIN: 75541;OT MANUAL THERAPY EA 15 MIN: 20732;OT BIS XTRACELL FLUID ANALYSIS: 28775;OT CARE PLAN EA 15 MIN;OT ORTHOTIC MGMT/TRAIN EA 15 MIN: 76460;OT ORTHO/PROSTHET CHECKOUT EA 15 MIN: 57165  -TD     Planned Therapy Interventions (Optional Details) home exercise program;manual therapy techniques;orthotic fitting/training;patient/family education;prosthetic fitting/training;ROM (Range of Motion);strengthening;stretching  -TD     OT Plan Comments continue  POC  -TD               User Key  (r) = Recorded By, (t) = Taken By, (c) = Cosigned By      Initials Name Provider Type    Jessica Esqueda OT Occupational Therapist                              Time Calculation:   Timed Charges  92705 - OT Manual Therapy Minutes: 30  Total Minutes  Timed Charges Total Minutes: 30   Total Minutes: 30     Therapy Charges for Today       Code Description Service Date Service Provider Modifiers Qty    [id number] HC OT MANUAL THERAPY EA 15 MIN 7/14/2025 Jessica Ochoa OT GO 2                      Jessica Ochoa OT  7/14/2025

## 2025-07-18 LAB
CONV .: NORMAL
CYTOLOGIST CVX/VAG CYTO: NORMAL
CYTOLOGY CVX/VAG DOC CYTO: NORMAL
CYTOLOGY CVX/VAG DOC THIN PREP: NORMAL
DX ICD CODE: NORMAL
Lab: NORMAL
OTHER STN SPEC: NORMAL
SERVICE CMNT-IMP: NORMAL
STAT OF ADQ CVX/VAG CYTO-IMP: NORMAL

## (undated) DEVICE — Device

## (undated) DEVICE — SOL IRRG H2O PL/BG 1000ML STRL

## (undated) DEVICE — Device: Brand: DEFENDO AIR/WATER/SUCTION AND BIOPSY VALVE

## (undated) DEVICE — CONN JET HYDRA H20 AUXILIARY DISP

## (undated) DEVICE — LINER SURG CANSTR SXN S/RIGD 1500CC

## (undated) DEVICE — SOLIDIFIER LIQLOC PLS 1500CC BT